# Patient Record
Sex: FEMALE | Employment: UNEMPLOYED | URBAN - METROPOLITAN AREA
[De-identification: names, ages, dates, MRNs, and addresses within clinical notes are randomized per-mention and may not be internally consistent; named-entity substitution may affect disease eponyms.]

---

## 2017-03-15 LAB
HBSAG, EXTERNAL: NEGATIVE
HIV, EXTERNAL: NEGATIVE
RPR, EXTERNAL: NORMAL
RUBELLA, EXTERNAL: NORMAL

## 2017-08-19 ENCOUNTER — HOSPITAL ENCOUNTER (EMERGENCY)
Age: 32
Discharge: HOME | DRG: 540 | End: 2017-08-19
Attending: OBSTETRICS & GYNECOLOGY | Admitting: OBSTETRICS & GYNECOLOGY
Payer: MEDICAID

## 2017-08-19 VITALS
SYSTOLIC BLOOD PRESSURE: 135 MMHG | RESPIRATION RATE: 18 BRPM | BODY MASS INDEX: 27.6 KG/M2 | DIASTOLIC BLOOD PRESSURE: 86 MMHG | WEIGHT: 150 LBS | HEIGHT: 62 IN | HEART RATE: 98 BPM | TEMPERATURE: 97.3 F

## 2017-08-19 PROBLEM — O26.899 ABDOMINAL PAIN IN PREGNANCY: Status: ACTIVE | Noted: 2017-08-19

## 2017-08-19 PROBLEM — R10.9 ABDOMINAL PAIN IN PREGNANCY: Status: ACTIVE | Noted: 2017-08-19

## 2017-08-19 NOTE — PROGRESS NOTES
Preadmission  Questions asked while  is here. No questions at this time. Given unit phone number for pt to call to receive her arrival time for Monday. Will give discharge instructions. Printed in [address]- what to expect, kick counts, labor precautions. marin at bs to assist with interpretation.

## 2017-08-19 NOTE — PROGRESS NOTES
Pt is scheduled for an induction on Monday pm. Instructions given for pt to call [phone] on Monday for her arrival time. [phone] is the pt's number that she can be reached at.

## 2017-08-19 NOTE — DISCHARGE INSTRUCTIONS
CALL mONDAY AT 3:00. [phone] TO GET HER ARRIVAL TIME. Semana 40 de newman embarazo: Instrucciones de cuidado - [ Week 36 of Your Pregnancy: Care Instructions ]  Instrucciones de cuidado    Para la semana 36, usted ha llegado a la fecha probable de [place]. Newman bebé podría venir en cualquier momento. Jonathan es dominique buena idea pensar en el futuro para las próximas semanas y lo que podría suceder. Si esta es la primera vez que tiene un bebé, trate de no preocuparse. Si usted no comienza el trabajo de parto por sí [employer] 41 o 42, newman médico le puede recomendar medicamentos para inducir el [place]. Esta hoja de [employer] acerca de cómo se puede inducir el [place]. Joshua Miles ideas sobre los ejercicios de respiración que puede hacer si [place] a sentirse ansiosa o si está tratando de [place]. La atención de seguimiento es dominique parte clave de newman tratamiento y seguridad. Asegúrese de hacer y acudir a todas las citas, y llame a newman médico si está teniendo problemas. También es dominique buena idea saber los resultados de los exámenes y mantener dominique lista de los medicamentos que suzan. ¿Cómo puede cuidarse en el hogar? Aprenda cómo se puede inducir el trabajo de parto  · Si usted y newman bebé están saludables y preparados, y si newman li uterino ha comenzado a dilatarse, newman médico puede romper la \"kelly\" (ruptura del saco amniótico). Con frecuencia, esto determina el comienzo del trabajo de [place]. · Si el li uterino no está preparado todavía, se le podría administrar un medicamento llamado Pitocin por vía intravenosa (IV), para comenzar las contracciones. · Si el li uterino todavía está [address], podrían colocarle tabletas de prostaglandina (misoprostol) en la vagina para ablandar el li uterino. Pruebe con ejercicios de imaginación guiada para ayudarse a relajar  · Encuentre un lugar cómodo para sentarse o acostarse. Cierre los ojos.   · [address] solo unas cuantas respiraciones profundas para relajarse. · Imagínese un ambiente que está en calma y en figueroa. Vici podría ser [address], un entorno de [facility] pradera o dominique escena que usted Eleonore Pickerel. · Imagine la escena y trate de añadirle algunos detalles. Por ejemplo: ¿Hay john?, ¿cómo es el linda?, ¿está despejado el linda, o hay nubes? · A menudo, ayuda agregar un sendero a la escena. Por ejemplo, al entrar en la pradera, imagine un mikhail que la conduce a través de la pradera a los árboles del otro lado. A medida que siga el mikhail avanzando por la pradera, se siente más y [place]. · Cuando esté muy metida en newman escena y se sienta relajada, tómese unos minutos para respirar lentamente y sentir la calma. · Cuando esté lista, sálgase poco a poco de la escena y regrese a la realidad. Dígase a sí misma que se sentirá relajada y vigorizada, y que se quedará con elbert sensación de calma. · Cuente hasta 3 y jorge alberto los ojos. ¿Dónde puede encontrar más información en inglés? Myron Edward a [email or web address] Zefernie Spotted [id number] en la búsqueda para aprender más acerca de \"Semana 36 de newman [place]: Instrucciones de cuidado - [ Week 36 of Your Pregnancy: Care Instructions ]. \"  Revisado: 16 marzo, 2017  Versión del contenido: 11.3  © 4320-1534 [employer]. Las instrucciones de cuidado fueron adaptadas bajo licencia por [employer] (which disclaims liability or warranty for this information). Si usted tiene [place] afección médica o sobre estas instrucciones, siempre pregunte a newman profesional de logan. [employer] niega toda garantía o responsabilidad por newman uso de esta información. Precauciones en el embarazo:  Instrucciones de cuidado - [ Pregnancy Precautions: Care Instructions ]  Instrucciones de cuidado  No hay dominique manera velarde de prevenir el trabajo de parto antes de la fecha esperada (trabajo de parto prematuro) o de prevenir la [place] de otros problemas en el embarazo. Jonathan hay cosas que puede hacer para aumentar las probabilidades de tener un embarazo saludable. Vaya a cara citas, siga los consejos de newman médico y cuídese. Coma velasquez y huang ejercicio (si newman médico lo permite). Y asegúrese de erika abundante agua. La atención de seguimiento es dominique parte clave de newman tratamiento y seguridad. Asegúrese de hacer y acudir a todas las citas, y llame a newman médico si está teniendo problemas. También es dominique buena idea saber los resultados de los exámenes y mantener dominique lista de los medicamentos que suzan. ¿Cómo puede cuidarse en el hogar? · Asegúrese de asistir a las citas prenatales. Newman médico le tomará la presión arterial en cada consulta. Newman médico también comprobará si tiene proteínas en newman orina. Tanto la presión arterial lissy gab la presencia de proteínas en la orina son señales de preeclampsia. Esta afección puede ser peligrosa tanto para usted gab para newman bebé. · Deyanira abundantes líquidos, suficientes para que newman orina sea de color amarillo stefania o transparente gab el agua. La deshidratación puede causar contracciones. Si tiene [facility], el corazón o el hígado y tiene que [place]'s líquidos, hable con newman médico antes de aumentar newman consumo. · Notifique a newman médico de inmediato si presenta cualquier síntoma de infección, tales gab:  ¨ Ardor cuando orina. ¨ Flujo con mal olor de la vagina. ¨ Comezón en la vagina. ¨ Fiebre sin explicación. ¨ Dolor o sensibilidad inusual en el útero o la parte baja del abdomen. · Aliméntese en forma equilibrada. Incluya muchos alimentos que [place] ricos en calcio y jessenia. ¨ Entre los alimentos ricos en calcio se incluyen la [place], el queso, el yogur, Sheryn Luiz y el brócoli. ¨ Entre los alimentos ricos en jessenia se incluyen las faye jorgensen, los [place], las aves, los [place], los frijoles, las uvas pasas, el pan de grano integral y las verduras de hojas verdes. · No fume.  Si necesita ayuda para dejar de fumar, hable con newman médico sobre programas y medicamentos para dejar de fumar. Estos pueden aumentar cara probabilidades de dejar el hábito para siempre. · No judah alcohol ni use drogas ilegales. · Siga las instrucciones de newman médico acerca de la [place]. Newman médico le dirá cuánto ejercicio puede hacer. · Pregúntele a newman médico si puede tener [employer]. Si usted está en riesgo de tener trabajo de [place], newman médico podría pedirle que no tenga relaciones sexuales. · [place] precauciones para prevenir las caídas. Issa el embarazo las articulaciones están más sueltas y se tiene menos equilibrio. Los deportes tales gab el ciclismo, el esquí o el patinaje en línea pueden aumentar el riesgo de caídas. Y no monte a esteban, ana en motocicleta, huang clavados, huang esquí acuático, bucee, ni salte en paracaídas mientras está embarazada. · Evite calentarse demasiado. No use saunas ni bañeras de hidromasaje. Evite la exposición al sol en climas calientes por mucho tiempo. [place] acetaminofén (Tylenol) para bajar dominique fiebre lissy. · No tome medicamentos de venta nico, productos herbarios ni suplementos sin hablar pari con newman médico o farmacéutico.  ¿Cuándo debe pedir ayuda? Llame al 911 en cualquier momento que considere que necesita atención de [place]. Por ejemplo, llame si:  · Se desmayó (perdió el conocimiento). · Tiene sangrado vaginal intenso. · Tiene dolor intenso en el vientre o la pelvis. · Le sale abundante líquido o gotea de la vagina y sabe o maia que el cordón umbilical se está saliendo a newman vagina. Si esto sucede, arrodíllese de inmediato, de marisol forma que cara nalgas estén más altas que newman desean. [place] disminuirá la presión sobre el cordón umbilical hasta que llegue la [facility].   Llame a newman médico ahora mismo o busque atención médica inmediata si:  · Tiene señales de preeclampsia, tales gab:  ¨ Se le hinchan de manera repentina la ronal, las kevin o los pies.  ¨ Problemas nuevos con la visión (gab oscurecimiento de la visión o visión borrosa). ¨ Dolor de desean intenso. · Tiene cualquier sangrado vaginal.  · Tiene dolor abdominal o cólicos. · Tiene fiebre. · Ha tenido contracciones regulares (con o sin dolor) por [place]. Tulsita significa que tiene 8 o más contracciones en 1 hora o que tiene 4 contracciones o más en 20 minutos después de [place] de posición y erika líquidos. · Tiene dominique pérdida repentina de líquido por la vagina. · Tiene dolor en la parte baja de la espalda o presión en la pelvis que no desaparece. · Nota que newman bebé ha dejado de moverse o se mueve mucho menos de lo normal.  Preste especial atención a los cambios en newman logan y asegúrese de comunicarse con newman médico si tiene algún problema. ¿Dónde puede encontrar más información en inglés? Minor [place] a [email or web address] Tevin Dwyer [id number] en la búsqueda para aprender más acerca de \"Precauciones en el embarazo: Instrucciones de cuidado - [ Pregnancy Precautions: Care Instructions ]. \"  Revisado: 16 marzo, 2017  Versión del contenido: 11.3  © 6913-8875 Healthwise, Incorporated. Las instrucciones de cuidado fueron adaptadas bajo licencia por [employer] Conne[employer] (which disclaims liability or warranty for this information). Si usted tiene [place] afección médica o sobre estas instrucciones, siempre pregunte a newman profesional de logan. Healthwise, Incorporated niega toda garantía o responsabilidad por newman uso de esta información. Conteo de las patadas de newman bebé: Instrucciones de cuidado - [ Jordi Robes Your [de-identified] Kicks: Care Instructions ]  Instrucciones de cuidado  Contar las patadas de newman bebé es dominique manera en la que newman médico puede establecer si newman bebé es saludable. La mayoría de las [facility], sobre todo en el primer embarazo, siente que newman bebé se mueve por primera vez entre las semanas 12 y 25. El movimiento puede sentirse más gab aleteos que gab patadas. Es posible que newman bebé se mueva más a ciertas horas del día. Cuando usted [place], puede notar menos patadas que cuando está descansando. En cara visitas prenatales, newman médico le preguntará si el bebé está activo. En el último trimestre, newman médico le puede pedir que cuente la cantidad de veces que sienta que el bebé se [place]. La atención de seguimiento es dominique parte clave de newman tratamiento y seguridad. Asegúrese de hacer y acudir a todas las citas, y llame a newman médico si está teniendo problemas. También es dominique buena idea saber los resultados de los exámenes y mantener dominique lista de los medicamentos que suzan. ¿Cómo se cuentan las patadas fetales? · Un método común para revisar el movimiento de newman bebé es contar la cantidad de patadas o movimientos que sienta en 1 hora. Es normal sentir 10 movimientos (gab patadas, aleteos o vueltas) en 1 hora. Algunos médicos sugieren que cuente lukas la [facility] llegar a los 10 movimientos. Luego usted puede dejar de contar por ellis día y comenzar otra vez al día siguiente. · Para contar, elija el momento del día en que newman bebé esté más activo. Podría ser cualquier momento entre la mañana y la noche. · Si no siente 10 movimientos en dominique hora, es posible que newman bebé esté durmiendo. Espere hasta la próxima hora y vuelva a contar. ¿Cuándo debe pedir ayuda? Llame a newman médico ahora mismo o busque atención médica inmediata si:  · Siente que newman bebé ha dejado de moverse o se mueve mucho menos de lo normal.  Preste especial atención a los cambios en newman logan y asegúrese de comunicarse con newman médico si tiene cualquier problema. ¿Dónde puede encontrar más información en inglés? Igor Lopez a [email or web address] Roel Linton [id number] en la búsqueda para aprender más acerca de \"Conteo de las patadas de newman bebé: Instrucciones de cuidado - [ Counting Your Baby's Kicks: Care Instructions ]. \"  Revisado: 16 marzo, 2017  Versión del contenido: 11.3  © 7905-5019 Healthwise, Incorporated. Las instrucciones de cuidado fueron adaptadas bajo licencia por [employer] Connections (which disclaims liability or warranty for this information). Si usted tiene [place] afección médica o sobre estas instrucciones, siempre pregunte a newman profesional de logan. Healthwise, Incorporated niega toda garantía o responsabilidad por newman uso de esta información.

## 2017-08-19 NOTE — PROGRESS NOTES
Social work consult needed due to language barrier and to be sure pt has all the resources and supplies she needs for this delivery and time after.

## 2017-08-19 NOTE — PROGRESS NOTES
marin at bs to assist with discharge instructions- kick counts, labor precautions, what to expect at 40 weeks. Instructions printed in [address] Pt without questions and verbalized understanding. Pt left unit ambulating.

## 2017-08-19 NOTE — PROGRESS NOTES
present upon patient admission, Dr. Satnam Alcantar, and Nurse Camille Torres's discharge instructions.

## 2017-08-19 NOTE — IP AVS SNAPSHOT
Eva Carbajal 
 
 
 [address] [place] [address] 
[phone] Patient: Dusty Piedra MRN: [id number] JUANPABLO:7/90/01[birth year] You are allergic to the following No active allergies Recent Documentation Height Weight BMI OB Status Smoking Status 1.575 m 68 kg 27.44 kg/m2 Pregnant Never Smoker Emergency Contacts Name Discharge Info Relation Home Work Mobile [address] [3] [phone] Hamp [place]ette  Spouse [3] [phone] About your hospitalization You were admitted on:  N/A You last received care in the:  SFE 4 ANTEPARTUM You were discharged on:  August 19, 2017 Unit phone number:  [phone] Why you were hospitalized Your primary diagnosis was:  Not on File Your diagnoses also included:  Abdominal Pain In Pregnancy Providers Seen During Your Hospitalizations Provider Role Specialty Primary office phone Merlyn Vaughan MD Attending Provider Obstetrics & Gynecology [phone] Your Primary Care Physician (PCP) Primary Care Physician Office Phone Office Fax UNKNOWN, PROVIDER ** None ** ** None ** Follow-up Information Follow up With Details Comments Contact Info Provider Unknown   Patient not available to ask Your Appointments Monday August 21, 2017  5:30 PM EDT  
CERVIDIL ENTRY PM/AM with SFE OB INDUCTIONS  
SFE 4 L&D PROCEDURE ([address] [place] [address]) [address] [facility] [address]  
[phone] Tuesday August 22, 2017  5:30 AM EDT  
SC INDUCTIONS with SFE OB INDUCTIONS  
SFE 4 L&D PROCEDURE ([address] [place] [address]) [address] [facility] [address]  
[phone] Current Discharge Medication List  
  
ASK your doctor about these medications Dose & Instructions Dispensing Information Comments Morning Noon Evening Bedtime COLACE 100 mg capsule Generic drug:  docusate sodium Your last dose was: Your next dose is:    
   
   
 Dose:  100 mg Take 100 mg by mouth daily. Refills:  0  
     
   
   
   
  
 ferrous sulfate 325 mg (65 mg iron) tablet Your last dose was: Your next dose is:    
   
   
 Dose:  325 mg Take 325 mg by mouth daily. Refills:  0 PRENATAL #2 PO Your last dose was: Your next dose is: Take  by mouth. Refills:  0 Discharge Instructions CALL mONDAY AT 3:00. [phone] TO GET HER ARRIVAL TIME. Semana 40 de newman embarazo: Instrucciones de cuidado - [ Week 36 of Your Pregnancy: Care Instructions ] Instrucciones de cuidado Para la semana 40, usted ha llegado a la fecha probable de Tigre. Newman bebé podría venir en cualquier momento. Jonathan es dominique buena idea pensar en el futuro para las próximas semanas y lo que podría suceder. Si esta es la primera vez que tiene un bebé, trate de no preocuparse. Si usted no comienza el trabajo de parto por sí [employer] 41 o 42, newman médico le puede recomendar medicamentos para inducir el [place] de Tigre. Esta hoja de Microsoft acerca de cómo se puede inducir el Vie[place]ach de [place]. Gladis Poke ideas sobre los ejercicios de respiración que puede hacer si Littie Baller a sentirse ansiosa o si está tratando de [place]. La atención de seguimiento es dominique parte clave de newman tratamiento y seguridad. Asegúrese de hacer y acudir a todas las citas, y llame a newman médico si está teniendo problemas. También es dominique buena idea saber los resultados de los exámenes y mantener dominique lista de los medicamentos que suzan. Cómo puede cuidarse en el hogar? Aprenda cómo se puede inducir el trabajo de Tigre · Si usted y newman bebé están saludables y preparados, y si newman li uterino ha comenzado a dilatarse, newman médico puede romper la \"kelly\" (ruptura del saco amniótico). Con frecuencia, esto determina el comienzo del trabajo de Tigre. · Si el li uterino no está preparado todavía, se le podría administrar un medicamento llamado Pitocin por vía intravenosa (IV), para comenzar las contracciones. · Si el li uterino todavía está [address], podrían colocarle tabletas de prostaglandina (misoprostol) en la vagina para ablandar el li uterino. Pruebe con ejercicios de imaginación guiada para ayudarse a relajar · Encuentre un lugar cómodo para sentarse o acostarse. Cierre los ojos. · Empiece tomando solo unas cuantas respiraciones profundas para relajarse. · Imagínese un ambiente que está en calma y en figueroa. [place] podría ser [address], un entorno de [facility] pradera o dominique escena que usted [place]. · Imagine la escena y trate de añadirle algunos detalles. Por ejemplo: 
Hay john?, 
cómo es el linda?, 
está despejado el linda, o hay nubes? · A menudo, ayuda agregar un sendero a la escena. Por ejemplo, al entrar en la pradera, imagine un mikhail que la conduce a través de la pradera a los árboles del otro lado. A medida que siga el mikhail avanzando por la pradera, se siente más y [place]. · Cuando esté muy metida en newman escena y se sienta relajada, tómese unos minutos para respirar lentamente y sentir la calma. · Cuando esté lista, sálgase poco a poco de la escena y regrese a la realidad. Dígase a sí misma que se sentirá relajada y vigorizada, y que se quedará con elbert sensación de calma. · Cuente hasta 3 y jorge alberto los ojos. Dónde puede encontrar más información en inglés? Neelima Phelps a [email or web address] Darlin Dai S499 en la búsqueda para aprender más acerca de \"Semana 36 de newman Rosa [place]: Instrucciones de cuidado - [ Week 36 of Your Pregnancy: Care Instructions ]. \" Revisado: 16 marzo, 2017 Versión del contenido: 11.3 © 8785-6768 Healthwise, Incorporated. Las instrucciones de cuidado fueron adaptadas bajo licencia por Good Help Connections (which disclaims liability or warranty for this information). Si usted tiene [place] afección médica o sobre estas instrucciones, siempre pregunte a newman profesional de logan. Healthwise, Incorporated niega toda garantía o responsabilidad por newman uso de esta información. Precauciones en el embarazo: Instrucciones de cuidado - [ Pregnancy Precautions: Care Instructions ] Instrucciones de cuidado No hay dominique manera velarde de prevenir el trabajo de parto antes de la fecha esperada (trabajo de parto prematuro) o de prevenir la mayoría de otros problemas en el [facility]. Jonathan hay cosas que puede hacer para aumentar las probabilidades de tener un embarazo saludable. Vaya a cara citas, siga los consejos de newman médico y cuídese. Coma velasquez y huang ejercicio (si newman médico lo permite). Y asegúrese de erika abundante agua. La atención de seguimiento es dominique parte clave de newman tratamiento y seguridad. Asegúrese de hacer y acudir a todas las citas, y llame a newman médico si está teniendo problemas. También es dominique buena idea saber los resultados de los exámenes y mantener dominique lista de los medicamentos que suzan. Cómo puede cuidarse en el hogar? · Asegúrese de asistir a las citas prenatales. Newman médico le tomará la presión arterial en cada consulta. Newman médico también comprobará si tiene proteínas en newman orina. Tanto la presión arterial lissy gab la presencia de proteínas en la orina son señales de preeclampsia. Esta afección puede ser peligrosa tanto para usted gab para newman bebé. · Deyanira abundantes líquidos, suficientes para que newman orina sea de color amarillo stefania o transparente gab el agua. La deshidratación puede causar contracciones.  Si tiene [facility] [employer], el corazón o el hígado y tiene que Twyla's líquidos, hable con pena médico antes de aumentar pena consumo. · Notifique a pena médico de inmediato si presenta cualquier síntoma de infección, tales gab: ¨ Ardor cuando orina. ¨ Flujo con mal olor de la vagina. ¨ Comezón en la vagina. ¨ Fiebre sin explicación. ¨ Dolor o sensibilidad inusual en el útero o la parte baja del abdomen. · Aliméntese en forma equilibrada. Incluya muchos alimentos que napole[place] ricos en calcio y jessenia. ¨ Entre los alimentos ricos en calcio se incluyen la [place], el queso, el yogur, Ishmael Reno y el brócoli. ¨ Entre los alimentos ricos en jessenia se incluyen las faye jorgensen, los [place], las aves, los [place], los frijoles, las uvas pasas, el pan de grano integral y las verduras de hojas verdes. · No fume. Si necesita ayuda para dejar de fumar, hable con pena médico sobre programas y medicamentos para dejar de fumar. Estos pueden aumentar cara probabilidades de dejar el hábito para siempre. · No judah alcohol ni use drogas ilegales. · Siga las instrucciones de pena médico acerca de la [place]. Pena médico le dirá cuánto ejercicio puede hacer. · Pregúntele a pena médico si puede tener [employer]. Si usted está en riesgo de tener trabajo de [place], pena médico podría pedirle que no tenga relaciones sexuales. · [place] precauciones para prevenir las caídas. Issa el embarazo las articulaciones están más sueltas y se tiene menos equilibrio. Los deportes tales gab el ciclismo, el esquí o el patinaje en línea pueden aumentar el riesgo de caídas. Y no monte a esteban, ana en motocicleta, huang clavados, huang esquí acuático, bucee, ni salte en paracaídas mientras está embarazada. · Evite calentarse demasiado. No use saunas ni bañeras de hidromasaje. Evite la exposición al sol en climas calientes por mucho tiempo. [place] acetaminofén (Tylenol) para bajar dominique fiebre lissy.  
· No tome medicamentos de venta nico, productos herbarios ni suplementos sin hablar pari con newman médico o farmacéutico. 

Cuándo debe pedir ayuda? Llame al 911 en cualquier momento que considere que necesita atención de [place]. Por ejemplo, llame si: 
· Se desmayó (perdió el conocimiento). · Tiene sangrado vaginal intenso. · Tiene dolor intenso en el vientre o la pelvis. · Le sale abundante líquido o gotea de la vagina y sabe o maia que el cordón umbilical se está saliendo a newman vagina. Si esto sucede, arrodíllese de inmediato, de marisol forma que cara nalgas estén más altas que newman desean. [place] disminuirá la presión sobre el cordón umbilical hasta que llegue la [facility]. Llame a newman médico ahora mismo o busque atención médica inmediata si: · Tiene señales de preeclampsia, tales gab: ¨ Se le hinchan de manera repentina la ronal, las kevin o los pies. ¨ Problemas nuevos con la visión (gab oscurecimiento de la visión o visión borrosa). ¨ Dolor de desean intenso. · Tiene cualquier sangrado vaginal. 
· Tiene dolor abdominal o cólicos. · Tiene fiebre. · Ha tenido contracciones regulares (con o sin dolor) por Susie Benny. [place]ady significa que tiene 8 o más contracciones en 1 hora o que tiene 4 contracciones o más en 20 minutos después de [place] de posición y erika líquidos. · Tiene dominique pérdida repentina de líquido por la vagina. · Tiene dolor en la parte baja de la espalda o presión en la pelvis que no desaparece. · Nota que newman bebé ha dejado de moverse o se mueve mucho menos de lo normal. 
Preste especial atención a los cambios en newman logan y asegúrese de comunicarse con newman médico si tiene algún problema. Dónde puede encontrar más información en inglés? Aaliyah Prather a [email or web address].[email or web address] Eleanor Murrell G667 en la búsqueda para aprender más acerca de \"Precauciones en el embarazo: Instrucciones de cuidado - [ Pregnancy Precautions: Care Instructions ]. \" 
Revisado: 16 marzo, 2017 Versión del contenido: 11.3 © 4282-9139 Healthwise, Incorporated. Las instrucciones de cuidado fueron adaptadas bajo licencia por Good [employer] Connections (which disclaims liability or warranty for this information). Si usted tiene [place] afección médica o sobre estas instrucciones, siempre pregunte a newman profesional de logan. Healthwise, Incorporated niega toda garantía o responsabilidad por newman uso de esta información. Conteo de las patadas de newman bebé: Instrucciones de cuidado - [ Counting Your Baby's Kicks: Care Instructions ] Instrucciones de cuidado Contar las patadas de newman bebé es dominique manera en la que newman médico puede establecer si newman bebé es saludable. La mayoría de las [facility], sobre todo en el primer embarazo, siente que newman bebé se mueve por primera vez entre las semanas 12 y 25. El movimiento puede sentirse más gab aleteos que gab patadas. Es posible que newman bebé se mueva más a ciertas horas del día. Cuando usbarbara Wells Mohamud, puede notar menos patadas que cuando está descansando. En cara visitas prenatales, newman médico le preguntará si el bebé está activo. En el último trimestre, newman médico le puede pedir que cuente la cantidad de veces que sienta que el bebé se [place]. La atención de seguimiento es dominique parte clave de newman tratamiento y seguridad. Asegúrese de hacer y acudir a todas las citas, y llame a newman médico si está teniendo problemas. También es dominique buena idea saber los resultados de los exámenes y mantener dominique lista de los medicamentos que suzan. Cómo se cuentan las patadas fetales? · Un método común para revisar el movimiento de newman bebé es contar la cantidad de patadas o movimientos que sienta en 1 hora. Es normal sentir 10 movimientos (gab patadas, aleteos o vueltas) en 1 hora. Algunos médicos sugieren que cuente lukas la [facility] llegar a los 10 movimientos. Luego usted puede dejar de contar por ellis día y comenzar otra vez al día siguiente. · Para contar, elija el momento del día en que newman bebé esté más activo. Podría ser cualquier momento entre la mañana y la noche. · Si no siente 10 movimientos en dominique hora, es posible que newman bebé esté durmiendo. Espere hasta la próxima hora y vuelva a contar. Cuándo debe pedir ayuda? Llame a newman médico ahora mismo o busque atención médica inmediata si: 
· Siente que newman bebé ha dejado de moverse o se mueve mucho menos de lo normal. 
Preste especial atención a los cambios en newman logan y asegúrese de comunicarse con newman médico si tiene cualquier problema. Dónde puede encontrar más información en inglés? Duke Mott a [email or web address] Wilberto Barron Q306 en la búsqueda para aprender más acerca de \"Conteo de las patadas de newman bebé: Instrucciones de cuidado - [ Counting Your Baby's Kicks: Care Instructions ]. \" 
Revisado: 16 marzo, 2017 Versión del contenido: 11.3 © 3920-2975 Healthwise, Incorporated. Las instrucciones de cuidado fueron adaptadas bajo licencia por Good Help Connections (which disclaims liability or warranty for this information). Si usted tiene [place] afección médica o sobre estas instrucciones, siempre pregunte a newman profesional de logan. Healthwise, Incorporated niega toda garantía o responsabilidad por newman uso de esta información. Discharge Orders None Introducing [facility]! [facility] introduce portal paciente MyChart . Ahora se puede acceder a partes de newman expediente médico, enviar por correo electrónico la oficina de newman médico y solicitar renovaciones de medicamentos en línea. En newman navegador de Internet , Obed Tee a [email or web address] Ezra clic en el usuario por Ehsan Castro? Megha De La Oer clic aquí en la sesión Bailey Alvarez. Verá la página de registro [place]. Ingrese newman código de [facility] marisol y gab aparece a continuación.  Usted no tendrá que [employer] código después de belia completado el proceso de registro . Si usted no se inscribe antes de la fecha de caducidad , debe solicitar un nuevo código. · MyChart Código de acceso : [id number] Expires: 9/5/2017  4:26 PM 
 
Ingresa los últimos cuatro dígitos de newman Número de Seguro Social ( xxxx ) y fecha de nacimiento ( dd / mm / aaaa ) gab se indica y ezra clic en Enviar. Usted será llevado a la siguiente página de registro . Crear un ID MyChart . Esta será newman ID de inicio de sesión de MyChart y no puede ser [place] , por lo que pensar en dominique que es Gemma Grills y fácil de recordar . Crear dominique contraseña MyChart . Usted puede cambiar newman contraseña en cualquier momento . Ingrese newman Password Reset de preguntas y [place] . Orick se puede utilizar en un momento posterior si usted olvida newman contraseña. Introduzca newman dirección de correo electrónico . Dearl Bird recibirá dominique notificación por correo electrónico cuando la nueva información está disponible en MyChart . Creed Hipps clic en Registrarse. Vianne Jaswinder rama y descargar porciones de newman expediente médico. 
Ezra clic en el enlace de descarga del menú Resumen para descargar dominique copia portátil de newman información médica . Si tiene [employer] , por favor visite la sección de preguntas frecuentes del sitio web MyChart . Recuerde, MyChart NO es que se utilizará para las necesidades urgentes. Para emergencias médicas , llame al 911 . Ahora disponible en newman iPhone y Android ! General Information Please provide this summary of care documentation to your next provider. Patient Signature:  ____________________________________________________________ Date:  ____________________________________________________________  
  
Gearldine Moulds Provider Signature:  ____________________________________________________________  Date:  ____________________________________________________________  
  
  
   
  
[address] 
 [facility] [address] 
[phone] Patient: Matias Raphael MRN: [id number] BKW:[id number] Tiene alergias a lo siguiente No tiene alergias Documentación recientes Height Weight BMI (IMC) Estado obstétrico Estatus de tabaquísmo 1.575 m 68 kg 27.44 kg/m2 Pregnant Never Smoker Emergency Contacts Name Discharge Info Relation Home Work Mobile [address] [3] [phone] Kenrick Gonsalez  Spouse [3] [phone] Sobre pena hospitalización Le admitieron el:  N/A Pena tratamiento más reciente fue el:  SFE 4 ANTEPARTUM Le dieron de lissy el:  August 19, 2017 Número de teléfono de la unidad:  [phone] Por qué le ingresaron Pena diagnosis primaria es:  No está archivado/a Pena diagnosis también incluye:  Abdominal Pain In Pregnancy Proveedores de verse lukas thalia hospitalizaciones Personal Médico Rol [facility]special[facility] Teléfono principal de la oficina Drew Lo MD Attending Provider Obstetrics & Gynecology [phone] Pena médico de atención primaria (PCP ) Primary Care Physician Office Phone Office Fax UNKNOWN, PROVIDER ** None ** ** None ** Follow-up Information Follow up With Details Comments Contact Info Provider Unknown   Patient not available to ask Thalia citas Monday August 21, 2017  5:30 PM EDT  
CERVIDIL ENTRY PM/AM with SFE OB INDUCTIONS  
SFE 4 L&D PROCEDURE ([address]) [address] [facility] [address]  
[phone] Tuesday August 22, 2017  5:30 AM EDT  
SC INDUCTIONS with SFE OB INDUCTIONS  
SFE 4 L&D PROCEDURE ([address]) [address] [facility] [address]  
[phone] Aprobación de la gestión actual lista de medicamentos CONSULTE con pena médico sobre Do It Original Dosis e instrucciones Información de dispensación Comentarios Morning Noon Evening Bedtime COLACE 100 mg capsule Medicamento genérico:  docusate sodium Your last dose was: Your next dose is:    
   
   
 Dosis:  100 mg Take 100 mg by mouth daily. recargas:  0  
     
   
   
   
  
 ferrous sulfate 325 mg (65 mg iron) tablet Your last dose was: Your next dose is:    
   
   
 Dosis:  325 mg Take 325 mg by mouth daily. recargas:  0 PRENATAL #2 PO Your last dose was: Your next dose is: Take  by mouth. recargas:  0 Discharge Instructions CALL mONDAY AT 3:00. [phone] TO GET HER ARRIVAL TIME. Semana 40 de newman embarazo: Instrucciones de cuidado - [ Week 36 of Your Pregnancy: Care Instructions ] Instrucciones de cuidado Para la semana 40, usted ha llegado a la fecha probable de Tigre. Newman bebé podría venir en cualquier momento. Jonathan es dominique buena idea pensar en el futuro para las próximas semanas y lo que podría suceder. Si esta es la primera vez que tiene un bebé, trate de no preocuparse. Si usted no comienza el trabajo de parto por sí [employer] 41 o 42, newman médico le puede recomendar medicamentos para inducir el [place]ie[place] de Tigre. Esta hoja de [employer] acerca de cómo se puede inducir el Viechtach de Tigre. Jose Maria Pique ideas sobre los ejercicios de respiración que puede hacer si Lidya Calvin a sentirse ansiosa o si está tratando de [place]. La atención de seguimiento es dominique parte clave de newman tratamiento y seguridad. Asegúrese de hacer y acudir a todas las citas, y llame a newman médico si está teniendo problemas. También es dominique buena idea saber los resultados de los exámenes y mantener dominique lista de los medicamentos que suzan. Cómo puede cuidarse en el hogar? Aprenda cómo se puede inducir el trabajo de Tigre · Si usted y newman bebé están saludables y preparados, y si newman li uterino ha comenzado a dilatarse, newman médico puede romper la \"kelly\" (ruptura del saco amniótico). Con frecuencia, esto determina el comienzo del trabajo de Tigre. · Si el li uterino no está preparado todavía, se le podría administrar un medicamento llamado Pitocin por vía intravenosa (IV), para comenzar las contracciones. · Si el li uterino todavía está [address], podrían colocarle tabletas de prostaglandina (misoprostol) en la vagina para ablandar el li uterino. Pruebe con ejercicios de imaginación guiada para ayudarse a relajar · Encuentre un lugar cómodo para sentarse o acostarse. Cierre los ojos. · Empiece tomando solo unas cuantas respiraciones profundas para relajarse. · Imagínese un ambiente que está en calma y en figueroa. [place] podría ser [address], un entorno de [facility] pradera o dominique escena que usted [place]. · Imagine la escena y trate de añadirle algunos detalles. Por ejemplo: 
Hay john?, 
cómo es el linda?, 
está despejado el linda, o hay nubes? · A menudo, ayuda agregar un sendero a la escena. Por ejemplo, al entrar en la pradera, imagine un mikhail que la conduce a través de la pradera a los árboles del otro lado. A medida que siga el mikhail avanzando por la pradera, se siente más y [place]. · Cuando esté muy metida en newman escena y se sienta relajada, tómese unos minutos para respirar lentamente y sentir la calma. · Cuando esté lista, sálgase poco a poco de la escena y regrese a la realidad. Dígase a sí misma que se sentirá relajada y vigorizada, y que se quedará con elbert sensación de calma. · Cuente hasta 3 y jorge alberto los ojos. Dónde puede encontrar más información en inglés? Neelima Phelps a [email or web address] Darlin Dai A215 en la búsqueda para aprender más acerca de \"Semana 36 de newman [place]: Instrucciones de cuidado - [ Week 36 of Your Pregnancy: Care Instructions ]. \" Revisado: 16 marzo, 2017 Versión del contenido: 11.3 © 3663-6660 Healthwise, Incorporated. Las instrucciones de cuidado fueron adaptadas bajo licencia por Good Help Connections (which disclaims liability or warranty for this information). Si usted tiene [place] afección médica o sobre estas instrucciones, siempre pregunte a newman profesional de logan. Healthwise, Incorporated niega toda garantía o responsabilidad por newman uso de esta información. Precauciones en el embarazo: Instrucciones de cuidado - [ Pregnancy Precautions: Care Instructions ] Instrucciones de cuidado No hay dominique manera velarde de prevenir el trabajo de parto antes de la fecha esperada (trabajo de parto prematuro) o de prevenir la mayoría de otros problemas en el [facility]. Jonathan hay cosas que puede hacer para aumentar las probabilidades de tener un embarazo saludable. Vaya a cara citas, siga los consejos de newman médico y cuídese. Coma velasquez y huang ejercicio (si newman médico lo permite). Y asegúrese de erika abundante agua. La atención de seguimiento es dominique parte clave de newman tratamiento y seguridad. Asegúrese de hacer y acudir a todas las citas, y llame a newman médico si está teniendo problemas. También es dominique buena idea saber los resultados de los exámenes y mantener dominique lista de los medicamentos que suzan. Cómo puede cuidarse en el hogar? · Asegúrese de asistir a las citas prenatales. Newman médico le tomará la presión arterial en cada consulta. Newman médico también comprobará si tiene proteínas en newman orina. Tanto la presión arterial lissy gab la presencia de proteínas en la orina son señales de preeclampsia. Esta afección puede ser peligrosa tanto para usted gab para newman bebé. · Deyanira abundantes líquidos, suficientes para que newman orina sea de color amarillo stefania o transparente gab el agua. La deshidratación puede causar contracciones.  Si tiene [place] [facility], el corazón o el hígado y tiene que Twyla's líquidos, hable con pena médico antes de aumentar pena consumo. · Notifique a pena médico de inmediato si presenta cualquier síntoma de infección, tales gab: ¨ Ardor cuando orina. ¨ Flujo con mal olor de la vagina. ¨ Comezón en la vagina. ¨ Fiebre sin explicación. ¨ Dolor o sensibilidad inusual en el útero o la parte baja del abdomen. · Aliméntese en forma equilibrada. Incluya muchos alimentos que [place] ricos en calcio y jessenia. ¨ Entre los alimentos ricos en calcio se incluyen la [place], el queso, el yogur, Ishmael Reno y el brócoli. ¨ Entre los alimentos ricos en jessenia se incluyen las faye jorgensen, los [place], las aves, los [place], los frijoles, las uvas pasas, el pan de grano integral y las verduras de hojas verdes. · No fume. Si necesita ayuda para dejar de fumar, hable con pena médico sobre programas y medicamentos para dejar de fumar. Estos pueden aumentar cara probabilidades de dejar el hábito para siempre. · No judah alcohol ni use drogas ilegales. · Siga las instrucciones de pena médico acerca de la [place]. Pena médico le dirá cuánto ejercicio puede hacer. · Pregúntele a pena médico si puede tener [employer]. Si usted está en riesgo de tener trabajo de [place], pena médico podría pedirle que no tenga relaciones sexuales. · [place] precauciones para prevenir las caídas. Issa el embarazo las articulaciones están más sueltas y se tiene menos equilibrio. Los deportes tales gab el ciclismo, el esquí o el patinaje en línea pueden aumentar el riesgo de caídas. Y no monte a esteban, ana en motocicleta, huang clavados, huang esquí acuático, bucee, ni salte en paracaídas mientras está embarazada. · Evite calentarse demasiado. No use saunas ni bañeras de hidromasaje. Evite la exposición al sol en climas calientes por mucho tiempo. [place] acetaminofén (Tylenol) para bajar dominique fiebre lissy.  
· No tome medicamentos de venta nico, productos herbarios ni suplementos sin hablar pari con newman médico o farmacéutico. 

Cuándo debe pedir ayuda? Llame al 911 en cualquier momento que considere que necesita atención de [place]. Por ejemplo, llame si: 
· Se desmayó (perdió el conocimiento). · Tiene sangrado vaginal intenso. · Tiene dolor intenso en el vientre o la pelvis. · Le sale abundante líquido o gotea de la vagina y sabe o maia que el cordón umbilical se está saliendo a newman vagina. Si esto sucede, arrodíllese de inmediato, de marisol forma que cara nalgas estén más altas que newman desean. [place] disminuirá la presión sobre el cordón umbilical hasta que llegue la [facility]. Llame a newman médico ahora mismo o busque atención médica inmediata si: · Tiene señales de preeclampsia, tales gab: ¨ Se le hinchan de manera repentina la ronal, las kevin o los pies. ¨ Problemas nuevos con la visión (gab oscurecimiento de la visión o visión borrosa). ¨ Dolor de desean intenso. · Tiene cualquier sangrado vaginal. 
· Tiene dolor abdominal o cólicos. · Tiene fiebre. · Ha tenido contracciones regulares (con o sin dolor) por Susie Benny. [place] significa que tiene 8 o más contracciones en 1 hora o que tiene 4 contracciones o más en 20 minutos después de [place] de posición y erika líquidos. · Tiene dominique pérdida repentina de líquido por la vagina. · Tiene dolor en la parte baja de la espalda o presión en la pelvis que no desaparece. · Nota que newman bebé ha dejado de moverse o se mueve mucho menos de lo normal. 
Preste especial atención a los cambios en newman logan y asegúrese de comunicarse con newman médico si tiene algún problema. Dónde puede encontrar más información en inglés? Aaliyah Prather a [email or web address] Eleanor Murrell [id number] en la búsqueda para aprender más acerca de \"Precauciones en el embarazo: Instrucciones de cuidado - [ Pregnancy Precautions: Care Instructions ]. \" 
Revisado: 16 marzo, 2017 Versión del contenido: 11.3 © 6208-2925 Healthwise, Incorporated. Las instrucciones de cuidado fueron adaptadas bajo licencia por [employer] (which disclaims liability or warranty for this information). Si usted tiene [place] afección médica o sobre estas instrucciones, siempre pregunte a newman profesional de logan. Healthwise, Incorporated niega toda garantía o responsabilidad por newman uso de esta información. Conteo de las patadas de newman bebé: Instrucciones de cuidado - [ Counting Your Baby's Kicks: Care Instructions ] Instrucciones de cuidado Contar las patadas de newman bebé es dominique manera en la que newman médico puede establecer si newman bebé es saludable. La mayoría de las [facility], sobre todo en el primer embarazo, siente que newman bebé se mueve por primera vez entre las semanas 12 y 25. El movimiento puede sentirse más gab aleteos que gab patadas. Es posible que newman bebé se mueva más a ciertas horas del día. Cuando usbarbara [place] Mohamud, puede notar menos patadas que cuando está descansando. En cara visitas prenatales, newman médico le preguntará si el bebé está activo. En el último trimestre, newman médico le puede pedir que cuente la cantidad de veces que sienta que el bebé se [place]. La atención de seguimiento es dominique parte clave de newman tratamiento y seguridad. Asegúrese de hacer y acudir a todas las citas, y llame a enwman médico si está teniendo problemas. También es dominique buena idea saber los resultados de los exámenes y mantener dominique lista de los medicamentos que suzan. Cómo se cuentan las patadas fetales? · Un método común para revisar el movimiento de newman bebé es contar la cantidad de patadas o movimientos que sienta en 1 hora. Es normal sentir 10 movimientos (gab patadas, aleteos o vueltas) en 1 hora. Algunos médicos sugieren que cuente lukas la [facility] llegar a los 10 movimientos. Luego usted puede dejar de contar por ellis día y comenzar otra vez al día siguiente. · Para contar, elija el momento del día en que newman bebé esté más activo. Podría ser cualquier momento entre la mañana y la noche. · Si no siente 10 movimientos en dominique hora, es posible que newman bebé esté durmiendo. Espere hasta la próxima hora y vuelva a contar. Cuándo debe pedir ayuda? Llame a newman médico ahora mismo o busque atención médica inmediata si: 
· Siente que newman bebé ha dejado de moverse o se mueve mucho menos de lo normal. 
Preste especial atención a los cambios en newman logan y asegúrese de comunicarse con newman médico si tiene cualquier problema. Dónde puede encontrar más información en inglés? Ayesha Murrieta a [email or web address] Hussain Her X915 en la búsqueda para aprender más acerca de \"Conteo de las patadas de newman bebé: Instrucciones de cuidado - [ Counting Your Baby's Kicks: Care Instructions ]. \" 
Revisado: 16 marzo, 2017 Versión del contenido: 11.3 © 9510-8821 Healthwise, Incorporated. Las instrucciones de cuidado fueron adaptadas bajo licencia por [employer] (which disclaims liability or warranty for this information). Si usted tiene [place] afección médica o sobre estas instrucciones, siempre pregunte a newman profesional de logan. Healthwise, Incorporated niega toda garantía o responsabilidad por newman uso de esta información. Discharge Orders [email or web address] Introducing \A [facility]ology [facility] [place] [facility]\"" & HEALTH SERVICES! [employer] introduce portal paciente [place] . Ahora se puede acceder a partes de newman expediente médico, enviar por correo electrónico la oficina de newman médico y solicitar renovaciones de medicamentos en línea. En newman navegador de Internet , Florencia Basil a [email or web address] Ezra clic en el usuario por Sunitha Mora? Lisa Loomis clic aquí en la sesión Luciano Patel. Verá la página de registro [place]. Ingrese newman código de [facility] marisol y gab aparece a continuación.  Usted no tendrá que [employer] código después de belia completado el proceso de registro . Si usted no se inscribe antes de la fecha de caducidad , debe solicitar un nuevo código. · MyChart Código de acceso : [id number] Expires: 9/5/2017  4:26 PM 
 
Ingresa los últimos cuatro dígitos de newman Número de Seguro Social ( xxxx ) y fecha de nacimiento ( dd / mm / aaaa ) gab se indica y ezra clic en Enviar. Usted será llevado a la siguiente página de registro . Crear un ID MyChart . Esta será newman ID de inicio de sesión de MyChart y no puede ser [place] , por lo que pensar en dominique que es Teresa Navjot y fácil de recordar . Crear dominique contraseña MyChart . Usted puede cambiar newman contraseña en cualquier momento . Ingrese newman Password Reset de preguntas y [place] . [place] se puede utilizar en un momento posterior si usted olvida newman contraseña. Introduzca newman dirección de correo electrónico . Thien Merritt recibirá dominique notificación por correo electrónico cuando la nueva información está disponible en MyChart . Marilia Rail clic en Registrarse. Silvia Ivania rama y descargar porciones de newman expediente médico. 
Ezra clic en el enlace de descarga del menú Resumen para descargar dominique copia portátil de newman información médica . Si tiene [employer] , por favor visite la sección de preguntas frecuentes del sitio web MyChart . Recuerde, MyChart NO es que se utilizará para las necesidades urgentes. Para emergencias médicas , llame al 911 . Ahora disponible en newman iPhone y Android ! General Information Please provide this summary of care documentation to your next provider. Patient Signature:  ____________________________________________________________ Date:  ____________________________________________________________  
  
Tana Piety Provider Signature:  ____________________________________________________________ Date:  ____________________________________________________________

## 2017-08-19 NOTE — IP AVS SNAPSHOT
Summary of Care Report The Summary of Care report has been created to help improve care coordination. Users with access to [facility] or [address] (Web-based application) may access additional patient information including the Discharge Summary. If you are not currently a [address] user and need more information, please call the number listed below in the [place] section and ask to be connected with Medical Records. Facility Information Name Address Phone [id number] [place] [address] Road [address] [place] [id number] [phone] Patient Information Patient Name Sex  Davis Chávez ([id number]) Female 1985 Discharge Information Admitting Provider Service Area Unit Maryan Mckinley MD / [address] 4 Antepartum / [phone] Discharge Provider Discharge Date/Time Discharge Disposition Destination (none) 2017 17:40 (Pending) AHR (none) Patient Language Language Irish [40] Hospital Problems as of 2017  Reviewed: 2017  5:07 PM by Maryan Mckinley MD  
  
  
  
 Class Noted - Resolved Last Modified POA Active Problems Abdominal pain in pregnancy  2017 - Present 2017 by Maryan Mckinley MD Unknown Entered by Maryan Mckinley MD  
  
Non-Hospital Problems as of 2017  Reviewed: 2017  5:07 PM by Maryan Mckinley MD  
 None You are allergic to the following No active allergies Current Discharge Medication List  
  
ASK your doctor about these medications Dose & Instructions Dispensing Information Comments COLACE 100 mg capsule Generic drug:  docusate sodium Dose:  100 mg Take 100 mg by mouth daily. Refills:  0  
   
 ferrous sulfate 325 mg (65 mg iron) tablet Dose:  325 mg Take 325 mg by mouth daily. Refills:  0 PRENATAL #2 PO Take  by mouth. Refills:  0 Follow-up Information Follow up With Details Comments Contact Info Provider Unknown   Patient not available to ask Discharge Instructions CALL mONDAY AT 3:00. [phone] TO GET HER ARRIVAL TIME. Semana 40 de newman embarazo: Instrucciones de cuidado - [ Week 36 of Your Pregnancy: Care Instructions ] Instrucciones de cuidado Para la semana 40, usted ha llegado a la fecha probable de [place]. Newman bebé podría venir en cualquier momento. Jonathan es dominique buena idea pensar en el futuro para las próximas semanas y lo que podría suceder. Si esta es la primera vez que tiene un bebé, trate de no preocuparse. Si usted no comienza el trabajo de parto por sí [employer] 41 o 42, newman médico le puede recomendar medicamentos para inducir el [place]. Esta hoja de [employer] acerca de cómo se puede inducir el [place]. Remigio Aranda ideas sobre los ejercicios de respiración que puede hacer si [place] a sentirse ansiosa o si está tratando de [place]. La atención de seguimiento es dominique parte clave de newman tratamiento y seguridad. Asegúrese de hacer y acudir a todas las citas, y llame a newman médico si está teniendo problemas. También es dominique buena idea saber los resultados de los exámenes y mantener dominique lista de los medicamentos que suzan. Cómo puede cuidarse en el hogar? Aprenda cómo se puede inducir el trabajo de [place] · Si usted y newman bebé están saludables y preparados, y si newman li uterino ha comenzado a dilatarse, newman médico puede romper la \"kelly\" (ruptura del saco amniótico). Con frecuencia, esto determina el comienzo del trabajo de [place]. · Si el li uterino no está preparado todavía, se le podría administrar un medicamento llamado Pitocin por vía intravenosa (IV), para comenzar las contracciones.  
· Si el li uterino todavía está [address], podrían colocarle tabletas de prostaglandina (misoprostol) en la vagina para ablandar el li uterino. Pruebe con ejercicios de imaginación guiada para ayudarse a relajar · Encuentre un lugar cómodo para sentarse o acostarse. Cierre los ojos. · Empiece tomando solo unas cuantas respiraciones profundas para relajarse. · Imagínese un ambiente que está en calma y en figueroa. [place] podría ser [address], un entorno de [facility], [facility] pradera o dominique escena que usted Karl Stroud. · Imagine la escena y trate de añadirle algunos detalles. Por ejemplo: 
Hay john?, 
cómo es el linda?, 
está despejado el linda, o hay nubes? · A menudo, ayuda agregar un sendero a la escena. Por ejemplo, al entrar en la pradera, imagine un mikhail que la conduce a través de la pradera a los árboles del otro lado. A medida que siga el mikhail avanzando por la pradera, se siente más y [place]. · Cuando esté muy metida en newman escena y se sienta relajada, tómese unos minutos para respirar lentamente y sentir la calma. · Cuando esté lista, sálgase poco a poco de la escena y regrese a la realidad. Dígase a sí misma que se sentirá relajada y vigorizada, y que se quedará con elbert sensación de calma. · Cuente hasta 3 y jorge alberto los ojos. Dónde puede encontrar más información en inglés? Leonel Almazan a [email or web address] Mirian Mackay A506 en la búsqueda para aprender más acerca de \"Semana 36 de newman Shantel Bereket: Instrucciones de cuidado - [ Week 36 of Your Pregnancy: Care Instructions ]. \" 
Revisado: 16 marzo, 2017 Versión del contenido: 11.3 © 2786-1581 Healthwise, Incorporated. Las instrucciones de cuidado fueron adaptadas bajo licencia por [employer] (which disclaims liability or warranty for this information). Si usted tiene [place] afección médica o sobre estas instrucciones, siempre pregunte a newman profesional de logan. Healthwise, Incorporated niega toda garantía o responsabilidad por newman uso de esta información. Precauciones en el embarazo: Instrucciones de cuidado - [ Pregnancy Precautions: Care Instructions ] Instrucciones de cuidado No hay dominique manera velarde de prevenir el trabajo de parto antes de la fecha esperada (trabajo de parto prematuro) o de prevenir la mayoría de otros problemas en el [facility]. Jonathan hay cosas que puede hacer para aumentar las probabilidades de tener un embarazo saludable. Vaya a cara citas, siga los consejos de newman médico y cuídese. Coma velasquez y huang ejercicio (si newman médico lo permite). Y asegúrese de erika abundante agua. La atención de seguimiento es dominique parte clave de newman tratamiento y seguridad. Asegúrese de hacer y acudir a todas las citas, y llame a newman médico si está teniendo problemas. También es dominique buena idea saber los resultados de los exámenes y mantener dominique lista de los medicamentos que suzan. Cómo puede cuidarse en el hogar? · Asegúrese de asistir a las citas prenatales. Newman médico le tomará la presión arterial en cada consulta. Newman médico también comprobará si tiene proteínas en newman orina. Tanto la presión arterial lissy gab la presencia de proteínas en la orina son señales de preeclampsia. Esta afección puede ser peligrosa tanto para usted gab para newman bebé. · Deyanira abundantes líquidos, suficientes para que newman orina sea de color amarillo stefania o transparente gab el agua. La deshidratación puede causar contracciones. Si tiene [employer], el corazón o el hígado y tiene que [place] líquidos, hable con newman médico antes de aumentar newman consumo. · Notifique a newman médico de inmediato si presenta cualquier síntoma de infección, tales gab: ¨ Ardor cuando orina. ¨ Flujo con mal olor de la vagina. ¨ Comezón en la vagina. ¨ Fiebre sin explicación. ¨ Dolor o sensibilidad inusual en el útero o la parte baja del abdomen. · Aliméntese en forma equilibrada. Incluya muchos alimentos que [place] ricos en calcio y jessenia. ¨ Entre los alimentos ricos en calcio se incluyen la [place], el queso, el yogur, Catr[place]ita Prows y el brócoli. ¨ Entre los alimentos ricos en jessenia se incluyen las faye jorgensen, los [place], las aves, los [place], los frijoles, las uvas pasas, el pan de grano integral y las verduras de hojas verdes. · No fume. Si necesita ayuda para dejar de fumar, hable con newman médico sobre programas y medicamentos para dejar de fumar. Estos pueden aumentar cara probabilidades de dejar el hábito para siempre. · No judah alcohol ni use drogas ilegales. · Siga las instrucciones de newman médico acerca de la [place]. Newman médico le dirá cuánto ejercicio puede hacer. · Pregúntele a newman médico si puede tener [employer]. Si usted está en riesgo de tener trabajo de [place], newman médico podría pedirle que no tenga relaciones sexuales. · Sellers precauciones para prevenir las caídas. Issa el embarazo las articulaciones están más sueltas y se tiene menos equilibrio. Los deportes tales gab el ciclismo, el esquí o el patinaje en línea pueden aumentar el riesgo de caídas. Y no monte a esteban, ana en motocicleta, huang clavados, huang esquí acuático, bucee, ni salte en paracaídas mientras está embarazada. · Evite calentarse demasiado. No use saunas ni bañeras de hidromasaje. Evite la exposición al sol en climas calientes por mucho tiempo. Sellers acetaminofén (Tylenol) para bajar dominique fiebre lissy. · No tome medicamentos de venta nico, productos herbarios ni suplementos sin hablar pari con newman médico o farmacéutico. 

Cuándo debe pedir ayuda? Llame al 911 en cualquier momento que considere que necesita atención de [place]. Por ejemplo, llame si: 
· Se desmayó (perdió el conocimiento). · Tiene sangrado vaginal intenso. · Tiene dolor intenso en el vientre o la pelvis. · Le sale abundante líquido o gotea de la vagina y sabe o maia que el cordón umbilical se está saliendo a newman vagina.  Si esto sucede, arrodíllese de inmediato, de marisol forma que cara nalgas estén más altas que newman desean. [place] disminuirá la presión sobre el cordón umbilical hasta que llegue la [facility]. Llame a newman médico ahora mismo o busque atención médica inmediata si: · Tiene señales de preeclampsia, tales gab: ¨ Se le hinchan de manera repentina la ronal, las kevin o los pies. ¨ Problemas nuevos con la visión (gab oscurecimiento de la visión o visión borrosa). ¨ Dolor de desean intenso. · Tiene cualquier sangrado vaginal. 
· Tiene dolor abdominal o cólicos. · Tiene fiebre. · Ha tenido contracciones regulares (con o sin dolor) por Verlan Spikes. [place] significa que tiene 8 o más contracciones en 1 hora o que tiene 4 contracciones o más en 20 minutos después de [place] de posición y erika líquidos. · Tiene dominique pérdida repentina de líquido por la vagina. · Tiene dolor en la parte baja de la espalda o presión en la pelvis que no desaparece. · Nota que newman bebé ha dejado de moverse o se mueve mucho menos de lo normal. 
Preste especial atención a los cambios en newman logan y asegúrese de comunicarse con newman médico si tiene algún problema. Dónde puede encontrar más información en inglés? Emmanuel Mathew a [email or web address] Varsha Layer T640 en la búsqueda para aprender más acerca de \"Precauciones en el embarazo: Instrucciones de cuidado - [ Pregnancy Precautions: Care Instructions ]. \" 
Revisado: 16 marzo, 2017 Versión del contenido: 11.3 © 5021-4074 [employer]. Las instrucciones de cuidado fueron adaptadas bajo licencia por [employer] (which disclaims liability or warranty for this information). Si usted tiene [place] afección médica o sobre estas instrucciones, siempre pregunte a newman profesional de logan. Healthwise, Incorporated niega toda garantía o responsabilidad por newman uso de esta información. Conteo de las patadas de newman bebé:  Instrucciones de cuidado - [ Deretha Massed Your Baby's Kicks: Care Instructions ] Instrucciones de cuidado Contar las patadas de newman bebé es dominique manera en la que newman médico puede establecer si newman bebé es saludable. La mayoría de las [facility], sobre todo en el primer embarazo, siente que newman bebé se mueve por primera vez entre las semanas 12 y 25. El movimiento puede sentirse más gab aleteos que gab patadas. Es posible que newman bebé se mueva más a ciertas horas del día. Cuando usted [place], puede notar menos patadas que cuando está descansando. En cara visitas prenatales, newman médico le preguntará si el bebé está activo. En el último trimestre, newman médico le puede pedir que cuente la cantidad de veces que sienta que el bebé se [place]. La atención de seguimiento es dominique parte clave de newman tratamiento y seguridad. Asegúrese de hacer y acudir a todas las citas, y llame a newman médico si está teniendo problemas. También es dominique buena idea saber los resultados de los exámenes y mantener dominique lista de los medicamentos que suzan. Cómo se cuentan las patadas fetales? · Un método común para revisar el movimiento de newman bebé es contar la cantidad de patadas o movimientos que sienta en 1 hora. Es normal sentir 10 movimientos (gab patadas, aleteos o vueltas) en 1 hora. Algunos médicos sugieren que cuente lukas la [facility] llegar a los 10 movimientos. Luego usted puede dejar de contar por ellis día y comenzar otra vez al día siguiente. · Para contar, elija el momento del día en que newman bebé esté más activo. Podría ser cualquier momento entre la mañana y la noche. · Si no siente 10 movimientos en dominique hora, es posible que newman bebé esté durmiendo. Espere hasta la próxima hora y vuelva a contar. Cuándo debe pedir ayuda?  
Llame a newman médico ahora mismo o busque atención médica inmediata si: 
· Siente que newman bebé ha dejado de moverse o se mueve mucho menos de lo normal. 
Preste especial atención a los cambios en newman logan y asegúrese de comunicarse con newman médico si tiene cualquier problema. Dónde puede encontrar más información en inglés? Erika Jane a [email or web address] Anabel Caden Q875 en la búsqueda para aprender más acerca de \"Conteo de las patadas de newman bebé: Instrucciones de cuidado - [ Counting Your Baby's Kicks: Care Instructions ]. \" 
Revisado: 16 marzo, 2017 Versión del contenido: 11.3 © 8902-3379 Healthwise, Incorporated. Las instrucciones de cuidado fueron adaptadas bajo licencia por [employer] (which disclaims liability or warranty for this information). Si usted tiene [place] afección médica o sobre estas instrucciones, siempre pregunte a newman profesional de logan. Healthwise, Incorporated niega toda garantía o responsabilidad por newman uso de esta información. Chart Review Routing History No Routing History on File

## 2017-08-20 ENCOUNTER — HOSPITAL ENCOUNTER (INPATIENT)
Age: 32
LOS: 4 days | Discharge: HOME | DRG: 540 | End: 2017-08-24
Attending: OBSTETRICS & GYNECOLOGY | Admitting: OBSTETRICS & GYNECOLOGY
Payer: MEDICAID

## 2017-08-20 PROCEDURE — 99284 EMERGENCY DEPT VISIT MOD MDM: CPT

## 2017-08-20 PROCEDURE — 59025 FETAL NON-STRESS TEST: CPT

## 2017-08-20 PROCEDURE — 65270000029 HC RM PRIVATE

## 2017-08-20 PROCEDURE — 4A1HXCZ MONITORING OF PRODUCTS OF CONCEPTION, CARDIAC RATE, EXTERNAL APPROACH: ICD-10-PCS | Performed by: OBSTETRICS & GYNECOLOGY

## 2017-08-20 NOTE — IP AVS SNAPSHOT
Zahra Lópezkervin 
 
 
 [address] 
[phone] Patient: Matias Raphael MRN: [id number] You are allergic to the following No active allergies Immunizations Administered for This Admission Name Date Tdap 8/24/2017 Recent Documentation Height Weight Breastfeeding? BMI OB Status Smoking Status 1.575 m 68 kg Yes 27.44 kg/m2 Recent pregnancy Never Smoker Emergency Contacts Name Discharge Info Relation Home Work Mobile [address] Patrick [3] [phone] Kenrick Gonsalez  Spouse [3] [phone] About your hospitalization You were admitted on:  August 20, 2017 You last received care in the:  [address] [facility] You were discharged on:  August 24, 2017 Unit phone number:  [phone] Why you were hospitalized Your primary diagnosis was:  Not on File Your diagnoses also included:  Normal Labor Providers Seen During Your Hospitalizations Provider Role Specialty Primary office phone Norman Hassan MD Attending Provider Gynecology [phone] Drew Lo MD Attending Provider Obstetrics & Gynecology [phone] Your Primary Care Physician (PCP) Primary Care Physician Office Phone Office Fax UNKNOWN, PROVIDER ** None ** ** None ** Follow-up Information Follow up With Details Comments Contact Info Drew Lo MD In 6 weeks call office to schedule an appointment to be seen in 6 weeks [address] 
[phone] Provider Unknown   Patient not available to ask Current Discharge Medication List  
  
START taking these medications Dose & Instructions Dispensing Information Comments Morning Noon Evening Bedtime HYDROcodone-acetaminophen 7.5-325 mg per tablet Commonly known as:  Lionel Heath Your last dose was: Your next dose is:    
   
   
 Dose:  1 Tab Take 1 Tab by mouth every four (4) hours as needed. Max Daily Amount: 6 Tabs. Quantity:  30 Tab Refills:  0  
     
   
   
   
  
 ibuprofen 800 mg tablet Commonly known as:  MOTRIN Your last dose was: Your next dose is:    
   
   
 Dose:  800 mg Take 1 Tab by mouth every eight (8) hours as needed. Quantity:  30 Tab Refills:  0 CONTINUE these medications which have NOT CHANGED Dose & Instructions Dispensing Information Comments Morning Noon Evening Bedtime COLACE 100 mg capsule Generic drug:  docusate sodium Your last dose was: Your next dose is:    
   
   
 Dose:  100 mg Take 100 mg by mouth daily. Refills:  0  
     
   
   
   
  
 ferrous sulfate 325 mg (65 mg iron) tablet Your last dose was: Your next dose is:    
   
   
 Dose:  325 mg Take 325 mg by mouth daily. Refills:  0 PRENATAL #2 PO Your last dose was: Your next dose is: Take  by mouth. Refills:  0 Where to Get Your Medications These medications were sent to [address] [place] [address], [address] [place] [id number] Phone:  [phone]  
  ibuprofen 800 mg tablet Information on where to get these meds will be given to you by the nurse or doctor. ! Ask your nurse or doctor about these medications HYDROcodone-acetaminophen 7.5-325 mg per tablet Discharge Instructions Parto por cesárea: Ash Cueva en karina bobby - [  Section: What to Expect at [facility] ] Pena recuperación Un parto por cesárea, o simplemente cesárea, es dominique cirugía mediante la cual se da a inez a un bebé a través de un cody, llamado incisión, que hace el médico en la parte baja del abdomen y [place]. Es posible que sienta dolor en la parte baja del abdomen y que necesite analgésicos (medicamentos para el dolor) lukas 1 o 2 semanas. Puede esperar tener algo de sangrado vaginal lukas varias semanas. Es probable que necesite unas 6 semanas para recuperarse completamente. Es importante que se tome las cosas con calma mientras augusto la incisión. Evite levantar objetos pesados, hacer actividades vigorosas o hacer ejercicios que pudieran esforzar los músculos abdominales mientras se recupera. Pídale a un familiar o amigo que la ayude con las tareas domésticas, la comida y las compras. Esta hoja de [employer] idea general del tiempo que le llevará recuperarse. Sin embargo, cada persona se recupera a un ritmo diferente. Siga los pasos que se mencionan a continuación para recuperarse lo más rápido posible. Cómo puede cuidarse en el hogar? Actividad · Descanse cuando se sienta cansada. Dormir lo suficiente la ayudará a recuperarse. · Intente caminar todos los días. Comience caminando un poco más de lo que caminó el día anterior. Poco a poco, aumente la distancia. Caminar mejora el flujo de [place] y [place] a prevenir la neumonía, el estreñimiento y los coágulos de [place]. · Evite las actividades intensas, gab montar en bicicleta, trotar, levantar pesas y hacer ejercicios aeróbicos lukas 6 semanas o hasta que newman médico lo apruebe. · Hasta que newman médico lo apruebe, no levante nada más pesado que newman bebé. · No huang abdominales ni ningún otro ejercicio que [employer] músculos del abdomen lukas 6 semanas o hasta que newman médico lo apruebe. · Sostenga dominique almohada sobre la incisión al toser o respirar profundamente. Janalee Gracey apoyo a newman abdomen y reducirá el dolor. · Puede ducharse gab de costumbre. Seque la incisión con toques suaves de toalla cuando termine. · Tendrá algo de sangrado vaginal. Use toallas sanitarias.  No se huang lavados vaginales ni use tampones hasta que el médico se lo permita. · Pregúntele a newman médico cuándo puede volver a conducir. · Es probable que necesite ausentarse del trabajo por lo menos 6 semanas. [place] dependerá del tipo de trabajo que huang y de cómo se sienta. · Pregúntele a newman médico cuándo puede tener [employer]. Alimentación · Puede continuar con newman dieta normal. Si tiene [employer], coma alimentos suaves bajos en grasa, gab arroz sin condimentar, manuel a la bre, pan haim y yogur. · Deyanira abundantes líquidos (a menos que newman médico le indique lo contrario). · Podría notar que no evacua el intestino con regularidad dominique después de la [place]. [place] es común. Trate de evitar el estreñimiento y no hacer esfuerzos cuando evacua el intestino. [place] vez desee erika un suplemento de TRiQ. Si no ha evacuado el intestino después de un par de días, pregúntele a newman médico si puede erika un laxante suave. · Si está amamantando, no deyanira alcohol. Medicamentos · Newman médico le dirá si puede volver a erika cara medicamentos y cuándo puede volver a hacerlo. También le dará indicaciones sobre cualquier medicamento nuevo que deba erika usted. · Si suzan medicamentos que previenen la formación de coágulos de [place], gab warfarina (Coumadin), clopidogrel (Plavix) o aspirina, asegúrese de hablar con newman médico. Él o rogerio le dirá si debe volver a erika estos medicamentos y en qué momento. Asegúrese de que entiende exactamente lo que el médico quiere que huang. · [employer] analgésicos (medicamentos para el dolor) exactamente gab le fueron indicados. ¨ Si el médico le recetó un analgésico, tómelo según las indicaciones. ¨ Si no está tomando un analgésico recetado, pregúntele a newman médico si puede erika hortencia de [employer]. · Si le parece que el analgésico le está produciendo malestar estomacal: [address] comidas (a menos que newman [employer] haya indicado lo contrario). ¨ Pídale al médico un analgésico diferente. · Si newman médico le recetó antibióticos, tómelos según las indicaciones. No deje de tomarlos por el hecho de sentirse mejor. Debe erika todos los antibióticos hasta terminarlos. Cuidado de la incisión · Si tiene tiras de cinta [employer] incisión, déjeselas puestas lukas dominique semana o hasta que se caigan por sí solas. · Lave la magda a diario con [place] tibia y séquela con toques suaves de toalla. No use peróxido de hidrógeno [place]) o alcohol porque pueden retrasar la sanación. Podría cubrir la magda con dominique venda de gasa si supura o roza contra la ropa. Cambie la venda todos los días. · Mantenga la magda limpia y seca. Otras instrucciones · Si amamanta a newman bebé, marisol vez le resulte más cómodo, lukas el proceso de sanación, sostener al bebé de [place] en la que no se apoye en newman abdomen. Intente poner a newman bebé debajo del brazo, con el cuerpo del lado que lo [place] a amamantar. Sostenga la parte superior del cuerpo de newman bebé con newman Cherelle Romp. Con elbert mano usted puede controlar la desean de newman bebé para acercarle la boca a newman seno. Heil se conoce a veces gab \"posición de balón de fútbol americano\". La atención de seguimiento es dominique parte clave de newman tratamiento y seguridad. Asegúrese de hacer y acudir a todas las citas, y llame a newman médico si está teniendo problemas. También es dominique buena idea saber los resultados de cara exámenes y mantener dominique lista de los medicamentos que suzan. Cuándo debe pedir ayuda? Llame al 911 en cualquier momento que considere que necesita atención de [place]. Por ejemplo, llame si: 
· Se desmayó (perdió el conocimiento). · Tiene síntomas de un coágulo de jacinto en el pulmón (llamado embolia pulmonar). Estos pueden incluir: ¨ Dolor repentino en el pecho. ¨ Dificultad para respirar. ¨ Toser jacinto. · Piensa en hacerse daño a sí misma o en lastimar a newman bebé o a otra persona. Llame a newman médico ahora mismo o busque atención médica inmediata si: · Tiene sangrado vaginal intenso. [place] significa que empapa dominique toalla sanitaria cada hora lukas 2 horas o más. · Se siente mareada o aturdida, o gab si fuera a desmayarse. · Tiene dolor abdominal nuevo o que empeora. · Tiene puntos de sutura flojos o se le abre la incisión. · Tiene síntomas de infección, tales gab: ¨ Aumento del dolor, la hinchazón, el enrojecimiento o la temperatura. ¨ Vetas rojizas que salen de la incisión. ¨ Pus que supura de la incisión. Fayne Blakes. · Tiene síntomas de un coágulo de jacinto en la pierna (llamado trombosis venosa profunda), tales gab: ¨ Dolor en la pantorrilla, el muslo, la ezio o detrás de la rodilla. ¨ Enrojecimiento e hinchazón en la pierna o la ezio. Preste especial atención a los cambios en newman logan y asegúrese de comunicarse con newman médico si: 
· Se siente ashely, ansiosa o desesperanzada lukas más de unos días. · No mejora gab se esperaba. Dónde puede encontrar más información en inglés? Myron Edward a [email or web address].[email or web address] Lulú Spotted V909 en la búsqueda para aprender más acerca de \"Parto por cesárea: Exbettie Patricia en el hogar - [  Section: What to Expect at [facility] ]. \" 
Revisado: 2017 Versión del contenido: 11.3 © 3681-0064 [employer], [employer]. Las instrucciones de cuidado fueron adaptadas bajo licencia por Good [employer] Connections (which disclaims liability or warranty for this information). Si usted tiene [place] Ferrum afección médica o sobre estas instrucciones, siempre pregunte a newman profesional de logan. [facility] niega toda garantía o responsabilidad por newman uso de esta información. Después del parto (período de posparto): Instrucciones de cuidado - [ After Your Delivery (the Postpartum Period): Care Instructions ] Instrucciones de cuidado Felicidades por el nacimiento de newman bebé.  Al igual que el Sweetie, [address] [facility] [address] tiempo con el recién nacido puede ser un momento de entusiasmo, alegría y agotamiento. Es posible que se sienta collado al mirar la jaya de newman pequeño bebé. También podría sentirse abrumada por newman nuevo ritmo de sueño y las nuevas responsabilidades. Al principio, los bebés suelen dormir lukas el día y permanecen despiertos lukas la noche. No tienen ningún patrón ni rutina. Podrían luis a gritos ahogados, sacudirse y despertarse, o parecer gab si tuvieran los ojos cruzados (bizcos). Todo esto es normal, e incluso la pueden hacer sonreír. Lukas las primeras semanas siguientes al parto, trate de cuidarse velasquez. Podría tardar de 4 a 6 semanas en volver a sentirse usted misma, y posiblemente más tiempo si le patterson hecho dominique cesárea. Es probable que se sienta muy fatigada lukas varias semanas. Thalia días estarán llenos de Fran, dino también de mucha alegría. La atención de seguimiento es dominique parte clave de newman tratamiento y seguridad. Asegúrese de hacer y acudir a todas las citas, y llame a newman médico si está teniendo problemas. También es dominique buena idea saber los resultados de los exámenes y mantener dominique lista de los medicamentos que suzan. Cómo puede cuidarse en el hogar? Cuide newman cuerpo después del parto · Utilice toallas sanitarias en vez de tampones para las pérdidas de jacinto que podrían durar hasta 2 semanas. · Alivie los cólicos con ibuprofeno (Advil, Motrin). · Alivie el dolor de las hemorroides y la magda entre la vagina y el recto con compresas de hielo o de infusión de kari Kincaid (\"witch hazel\"). · Alivie el estreñimiento bebiendo abundante líquido y comiendo alimentos ricos en fibra. Pregúntele a newman médico acerca de los ablandadores de heces de [place]. · Límpiese con un chorrito suave de agua tibia de dominique botella en vez de hacerlo con papel higiénico. 
· Sorrento un baño de asiento en agua tibia varias veces al día. · Use un buen sostén de lactancia. Alivie el dolor y la hinchazón de los senos con toallitas de aseo húmedas tibias. · Si no está amamantando, utilice hielo en lugar de calor para aliviar el dolor de los senos. · Si está amamantando, newman período menstrual podría no comenzar hasta después de varios meses. Es posible que Antonio, y más tiempo al principio, de gab lo hacía antes del [facility]. · Espere hasta que haya sanado (de 4 a 6 semanas) antes de volver a tener relaciones sexuales. Newman médico le dirá cuándo puede tener relaciones sexuales. · Trate de no viajar con el bebé lukas las primeras 5 o 6 semanas. Si hace un viaje lynette en automóvil, huang paradas frecuentes para caminar y estirarse. Evite el agotamiento · Descanse todos los días. Trate de dormir la siesta cuando newman bebé también lo hace. · Pídale a otro adulto que la acompañe por unos joselyn después del [place]. · Planifique el cuidado de los niños si tiene otros hijos. · Sea flexible para que pueda comer a horas fuera de lo común y dormir cuando lo necesite. Tanto usted gab newman bebé están creando horarios nuevos. · Planee pequeñas salidas para estar afuera de la casa. El cambio podría hacer que se sienta menos fatigada. · Pida ayuda para cocinar y [address] hogar y las compras. Recuerde que newman principal tarea consiste en cuidar a newman bebé. Conozca la ayuda que puede recibir en aysha de tener depresión posparto · La depresión posparto es común lukas las primeras 1 a 2 semanas siguientes al nacimiento. Podría llorar o sentirse ashely o irritable sin razón alguna. · Descanse cada vez que pueda hacerlo. Estar fatigada le dificulta manejar cara emociones. · Salga a caminar con newman bebé. · Hable con newman danial, cara amigos y cara familiares acerca de cara sentimientos.  
· Si cara síntomas woo más de unas pocas semanas, o si se siente muy deprimida, pídale ayuda a newman médico. 
 · La depresión posparto puede tratarse. Los grupos de apoyo y la asesoría psicológica pueden ser de [place]. A veces, los medicamentos también pueden ayudar. Manténgase saludable · Coma alimentos sanos para tener más energía, producir dominique buena [place] materna y adelgazar las libras adicionales que engordó con el bebé. · Si amamanta, evite el alcohol y las drogas. No fume. Si dejó de fumar lukas el embarazo, felicitaciones. · Inicie ejercicios diarios después de 4 a 6 semanas, dino descanse cuando se sienta fatigada. · Aprenda ejercicios para tonificar el abdomen. Ezra los ejercicios de Kegel para recuperar la fuerza de los músculos pélvicos. Puede hacer los ejercicios de Kegel mientras está de pie o sentada. ¨ Apriete los mismos músculos que usted usaría para detener la [place]. El abdomen y los muslos no deben moverse. ¨ Manténgalos apretados lukas 3 segundos y, luego, relájelos otros 3 segundos. ¨ Empiece con 3 segundos. Marchia Prudent, añada 1 bethel cada semana hasta que sea capaz de apretar lukas 10 segundos. ¨ Repita el ejercicio entre 10 y 13 veces cada sesión. Ezra muna o más sesiones cada día. · Busque dominique clase para nuevas madres y recién nacidos que tenga un tiempo de ejercicio. · Si le patterson hecho dominique cesárea, dese un poco más de tiempo antes de hacer ejercicio, y tenga cuidado. Cuándo debe pedir ayuda? Llame al 911 en cualquier momento que considere que necesita atención de [place]. Por ejemplo, llame si: 
· Se desmayó (perdió el conocimiento). Llame a newman médico ahora mismo o busque atención médica inmediata si: · Tiene sangrado vaginal intenso. [place] significa que está expulsando coágulos sanguíneos y empapando dominique toalla sanitaria cada hora por 2 horas o más. · Está mareada o aturdida, o siente gab que se puede [address] [facility] 15. · Tiene fiebre. · Tiene dolor abdominal nuevo o que empeora.  
Preste especial atención a los cambios en newman logan y asegúrese de comunicarse con newman médico si: 
 · Pena sangrado vaginal parece volverse más intenso. · Tiene flujo vaginal nuevo o que empeora. · Se siente ashely, ansiosa o sin esperanzas lukas más de unos pocos días. · No mejora gab se esperaba. Dónde puede encontrar más información en inglés? Duke Mott a [email or web address] Wilberto Barron W680 en la búsqueda para aprender más acerca de \"Después del parto (período de posparto): Instrucciones de cuidado - [ After Your Delivery (the Postpartum Period): Care Instructions ]. \" 
Revisado: 16 marzo, 2017 Versión del contenido: 11.3 © 1591-2383 Healthwise, Incorporated. Las instrucciones de cuidado fueron adaptadas bajo licencia por Good Help Connections (which disclaims liability or warranty for this information). Si usted tiene [place] afección médica o sobre estas instrucciones, siempre pregunte a pena profesional de logan. Healthwise, Incorporated niega toda garantía o responsabilidad por pena uso de esta información. DISCHARGE SUMMARY from Nurse The following personal items are in your possession at time of discharge: 
 
Dental Appliances: None Visual Aid: None Home Medications: None Jewelry: None Clothing: At bedside Other Valuables: Altagracia Tamez [address], JACQUELINE Marinelli PATIENT INSTRUCTIONS: 
 
After general anesthesia or intravenous sedation, for 24 hours or while taking prescription Narcotics: · Limit your activities · Do not drive and operate hazardous machinery · Do not make important personal or business decisions · Do  not drink alcoholic beverages · If you have not urinated within 8 hours after discharge, please contact your surgeon on call. Report the following to your surgeon: 
· Excessive pain, swelling, redness or odor of or around the surgical area · Temperature over 100.5 · Nausea and vomiting lasting longer than 4 hours or if unable to take medications · Any signs of decreased circulation or nerve impairment to extremity: change in color, persistent  numbness, tingling, coldness or increase pain · Any questions What to do at Home: 
Recommended activity: 
 
Discharge instruction to follow: Activity: Pelvis rest for 6 weeks No heavy lifting over 15 lbs for 2 weeks No driving for 2 weeks No push/pull motion such as sweeping or vacuuming for 2 weeks No tub baths for 6 weeks  section keep incision clean and dry, may shower as normal with soap and water. Inspect incision every day for signs of infection listed below. Continue to use klaudia-bottle with every void or bowel movement until comfortable stopping. Change sanitary pad after each urination or bowel movement. Call MD for the following: 
    Fever over 101 F; pain not relieved by medication; foul smelling vaginal discharge or increase in vaginal bleeding. Redness, swelling, or drainage from  incision. Take medication as prescribed. Follow up with MD as order. *  Please give a list of your current medications to your Primary Care Provider. *  Please update this list whenever your medications are discontinued, doses are 
    changed, or new medications (including over-the-counter products) are added. *  Please carry medication information at all times in case of emergency situations. These are general instructions for a healthy lifestyle: No smoking/ No tobacco products/ Avoid exposure to second hand smoke Surgeon General's Warning:  Quitting smoking now greatly reduces serious risk to your health. Obesity, smoking, and sedentary lifestyle greatly increases your risk for illness A healthy diet, regular physical exercise & weight monitoring are important for maintaining a healthy lifestyle You may be retaining fluid if you have a history of heart failure or if you experience any of the following symptoms:  Weight gain of 3 pounds or more overnight or 5 pounds in a week, increased swelling in our hands or feet or shortness of breath while lying flat in bed. Please call your doctor as soon as you notice any of these symptoms; do not wait until your next office visit. Recognize signs and symptoms of STROKE: 
 
F-face looks uneven A-arms unable to move or move unevenly S-speech slurred or non-existent T-time-call 911 as soon as signs and symptoms begin-DO NOT go Back to bed or wait to see if you get better-TIME IS BRAIN. Warning Signs of HEART ATTACK Call 911 if you have these symptoms: 
? Chest discomfort. Most heart attacks involve discomfort in the center of the chest that lasts more than a few minutes, or that goes away and comes back. It can feel like uncomfortable pressure, squeezing, fullness, or pain. ? Discomfort in other areas of the upper body. Symptoms can include pain or discomfort in one or both arms, the back, neck, jaw, or stomach. ? Shortness of breath with or without chest discomfort. ? Other signs may include breaking out in a cold sweat, nausea, or lightheadedness. Don't wait more than five minutes to call [address]! Fast action can save your life. Calling 911 is almost always the fastest way to get lifesaving treatment. Emergency Medical Services staff can begin treatment when they arrive  up to an hour sooner than if someone gets to the hospital by car. The discharge information has been reviewed with the patient. The patient verbalized understanding. Discharge medications reviewed with the patient and appropriate educational materials and side effects teaching were provided. Discharge Orders None [facility] Announcement We are excited to announce that we are making your provider's discharge notes available to you in [facility].   You will see these notes when they are completed and signed by the physician that discharged you from your recent hospital stay. If you have any questions or concerns about any information you see in MyChart, please call the Health Information Department where you were seen or reach out to your Primary Care Provider for more information about your plan of care. Introducing \[facility] [place] [facility]\"" HEALTH SERVICES! Emile Rondon introduce portal paciente MyChart . Ahora se puede acceder a partes de newman expediente médico, enviar por correo electrónico la oficina de newman médico y solicitar renovaciones de medicamentos en línea. En newman navegador de Internet , Alexandria Aguilari a [email or web address] Huang clic en el usuario por Kathrin Comber? Erika Halon clic aquí en la sesión Loanne . Verá la página de registro Harvey. Ingrese newman código de [employer] marisol y gab aparece a continuación. Usted no tendrá que [employer] código después de belia completado el proceso de registro . Si usted no se inscribe antes de la fecha de caducidad , debe solicitar un nuevo código. · MyChart Código de acceso : [id number] Expires: 9/5/2017  4:26 PM 
 
Ingresa los últimos cuatro dígitos de newman Número de Seguro Social ( xxxx ) y fecha de nacimiento ( dd / mm / aaaa ) gab se indica y huang clic en Enviar. ted será llevado a la siguiente página de registro . Crear un ID MyChart . Esta será newman ID de inicio de sesión de MyChart y no puede ser [place] , por lo que pensar en dominique que es [place] y fácil de recordar . Crear dominique contraseña MyChart . Usted puede cambiar newman contraseña en cualquier momento . Ingrese newman Password Reset de preguntas y [place] . [place] se puede utilizar en un momento posterior si usted olvida newman contraseña. Introduzca newman dirección de correo electrónico . Liudmila Quale recibirá dominique notificación por correo electrónico cuando la nueva información está disponible en MyChart . August Chilango kearney en Registrarse.  Thelma Padron rama y descargar porciones de newman expediente Summer kearney en el enlace de descarga del menú Resumen para descargar Lis Freire copia portátil de pena información médica . Si tiene [employer] , por favor visite la sección de preguntas frecuentes del sitio web MyChart . Fatemeh Short NO es que se utilizará para las necesidades urgentes. Para emergencias médicas , llame al 911 . Ahora disponible en pena iPhone y Android ! General Information Please provide this summary of care documentation to your next provider. Patient Signature:  ____________________________________________________________ Date:  ____________________________________________________________  
  
[facility] Provider Signature:  ____________________________________________________________ Date:  ____________________________________________________________  
  
  
   
  
Rafy Reed [address] [facility] 
[phone] Patient: Mikhail Quintana MRN: [id number] PCE:[birth year] Tiene alergias a lo siguiente No tiene mararnick Parada Administradas en esta admisión:    
   
 Dajuan Ambrocio Tdap 8/24/2017 Documentación recientes Height Weight Está amamantando? BMI [facility]) Estado obstétrico Estatus de tabaquísmo 1.575 m 68 kg Yes 27.44 kg/m2 Recent pregnancy Never Smoker Emergency Contacts Name Discharge Info Relation Home Work Mobile [address] [3] [phone] Keyonna Martinez  Spouse [3] [phone] Sobre pena hospitalización Le admitieron el:  August 20, 2017 Pena tratamiento más reciente fue el:  SFE 4 MOTHER INFANT Le dieron de lissy el:  August 24, 2017 Número de teléfono de la unidad:  [phone] Por qué le ingresaron Pena diagnosis primaria es:  No está archivado/a Pena diagnosis también incluye:  Normal Labor Proveedores de verse lukas cara hospitalizaciones Personal Médico Rol Especialidad Teléfono principal de la oficina Jeffrey Jones MD Attending Provider Gynecology [phone] Olene Boxer, MD Attending Provider Obstetrics & Gynecology [phone] Pena médico de atención primaria (PCP ) Primary Care Physician Office Phone Office Fax UNKNOWN, PROVIDER ** None ** ** None ** Follow-up Information Follow up With Details Comments Contact Info Olene Boxer, MD In 6 weeks call office to schedule an appointment to be seen in 6 weeks [address] 
[phone] Provider Unknown   Patient not available to ask Aprobación de la gestión actual lista de medicamentos EMPIECE a erika abcdexperts Dosis e instrucciones Información de dispensación Comentarios Morning Noon Evening Bedtime HYDROcodone-acetaminophen 7.5-325 mg per tablet También conocido gab:  Andree Gomez Your last dose was: Your next dose is:    
   
   
 Dosis:  1 Tab Take 1 Tab by mouth every four (4) hours as needed. Max Daily Amount: 6 Tabs. cantidad:  30 Tab  
recargas:  0  
     
   
   
   
  
 ibuprofen 800 mg tablet También conocido gab:  MOTRIN Your last dose was: Your next dose is:    
   
   
 Dosis:  800 mg Take 1 Tab by mouth every eight (8) hours as needed. cantidad:  30 Tab  
recargas:  0 CONTINUAR estos medicamentos que no [place] Dosis e instrucciones Información de dispensación Comentarios Morning Noon Evening Bedtime COLACE 100 mg capsule Medicamento genérico:  docusate sodium Your last dose was: Your next dose is:    
   
   
 Dosis:  100 mg Take 100 mg by mouth daily. recargas:  0  
     
   
   
   
  
 ferrous sulfate 325 mg (65 mg iron) tablet Your last dose was: Your next dose is:    
   
   
 Dosis:  325 mg Take 325 mg by mouth daily. recargas:  0  PRENATAL #2 PO  
   
 Your last dose was: Your next dose is: Take  by mouth. recargas:  0 Dónde puede recoger cara medicamentos Estos medicamentos fueron enviados a [address], [address] [facility] [address] [address] Teléfono:  [phone]  
  ibuprofen 800 mg tablet Información sobre dónde obtener estos medicamentos se le dará a usted por la enfermera o el médico.   
 ! Pregunte a newman médico o enfermero/a sobre [employer] HYDROcodone-acetaminophen 7.5-325 mg per tablet Discharge Instructions Parto por cesárea: Shad Chavarria en el \[facility]\"" - [  Section: What to Expect at [facility] ] Newman recuperación Un parto por cesárea, o simplemente cesárea, es dominique cirugía mediante la cual se da a inez a un bebé a través de un cody, llamado incisión, que hace el médico en la parte baja del abdomen y [place]. Es posible que sienta dolor en la parte baja del abdomen y que necesite analgésicos (medicamentos para el dolor) lukas 1 o 2 semanas. Puede esperar tener algo de sangrado vaginal lukas varias semanas. Es probable que necesite unas 6 semanas para recuperarse completamente. Es importante que se tome las cosas con calma mientras augusto la incisión. Evite levantar objetos pesados, hacer actividades vigorosas o hacer ejercicios que pudieran esforzar los músculos abdominales mientras se recupera. Pídale a un familiar o amigo que la ayude con las tareas domésticas, la comida y las compras. Esta hoja de [employer] idea general del tiempo que le llevará recuperarse. Sin embargo, cada persona se recupera a un ritmo diferente. Siga los pasos que se mencionan a continuación para recuperarse lo más rápido posible. Cómo puede cuidarse en el hogar? Actividad · Descanse cuando se sienta cansada. Dormir lo suficiente la ayudará a recuperarse. · Intente caminar todos los días. Comience caminando un poco más de lo que caminó el día anterior. Poco a poco, aumente la distancia. Caminar mejora el flujo de [address] y [place] a prevenir la neumonía, el estreñimiento y los coágulos de [address] · Evite las actividades intensas, gab montar en bicicleta, trotar, levantar pesas y hacer ejercicios aeróbicos lukas 6 semanas o hasta que newman médico lo apruebe. · Hasta que newman médico lo apruebe, no levante nada más pesado que newman bebé. · No huang abdominales ni ningún otro ejercicio que [employer] músculos del abdomen lukas 6 semanas o hasta que newman médico lo apruebe. · Sostenga dominique almohada sobre la incisión al toser o respirar profundamente. Theador Nip apoyo a newman abdomen y reducirá el dolor. · Puede ducharse gab de costumbre. Seque la incisión con toques suaves de toalla cuando termine. · Tendrá algo de sangrado vaginal. Use toallas sanitarias. No se huang lavados vaginales ni use tampones hasta que el médico se lo permita. · Pregúntele a newman médico cuándo puede volver a conducir. · Es probable que necesite ausentarse del trabajo por lo menos 6 semanas. Narcissa dependerá del tipo de trabajo que huang y de cómo se sienta. · Pregúntele a newman médico cuándo puede tener [employer]. Alimentación · Puede continuar con newman dieta normal. Si tiene [employer], coma alimentos suaves bajos en grasa, gab arroz sin condimentar, manuel a la bre, pan haim y yogur. · Deyanira abundantes líquidos (a menos que newman médico le indique lo contrario). · Podría notar que no evacua el intestino con regularidad dominique después de la [place]. Narcissa es común. Trate de evitar el estreñimiento y no hacer esfuerzos cuando evacua el intestino. [place] vez desee erika un suplemento de [employer] [facility]. Si no ha evacuado el intestino después de un par de días, pregúntele a newman médico si puede erika un laxante suave. · Si está amamantando, no deyanira alcohol. Medicamentos · Newman médico le dirá si puede volver a erika cara medicamentos y cuándo puede volver a hacerlo. También le dará indicaciones sobre cualquier medicamento nuevo que deba erika usted. · Si suzan medicamentos que previenen la formación de coágulos de [place], gab warfarina (Coumadin), clopidogrel (Plavix) o aspirina, asegúrese de hablar con newman médico. Él o rogerio le dirá si debe volver a erika estos medicamentos y en qué momento. Asegúrese de que entiende exactamente lo que el médico quiere que huang. · [employer] analgésicos (medicamentos para el dolor) exactamente gab le fueron indicados. ¨ Si el médico le recetó un analgésico, tómelo según las indicaciones. ¨ Si no está tomando un analgésico recetado, pregúntele a newman médico si puede erika hortencia de [employer]. · Si le parece que el analgésico le está produciendo malestar estomacal: [address] comidas (a menos que newman médico le haya indicado lo contrario). ¨ Pídale al médico un analgésico diferente. · Si newman médico le recetó antibióticos, tómelos según las indicaciones. No deje de tomarlos por el hecho de sentirse mejor. Debe erika todos los antibióticos hasta terminarlos. Cuidado de la incisión · Si tiene tiras de cinta [employer] incisión, déjeselas puestas lukas dominique semana o hasta que se caigan por sí solas. · Lave la magda a diario con [place] tibia y séquela con toques suaves de toalla. No use peróxido de hidrógeno [employer]) o alcohol porque pueden retrasar la sanación. Podría cubrir la magda con dominique venda de gasa si supura o roza contra la ropa. Cambie la venda todos los días. · Mantenga la magda limpia y seca. Otras instrucciones · Si amamanta a newman bebé, marisol vez le resulte más cómodo, lukas el proceso de sanación, sostener al bebé de [place] en la que no se apoye en newman abdomen.  Intente poner a newman bebé debajo del brazo, con el cuerpo del lado que lo Annemarie Salvage a amamantar. Sostenga la parte superior del cuerpo de newman bebé con newman Joel Copper. Con elbert mano usted puede controlar la desean de newman bebé para acercarle la boca a newman seno. [place] se conoce a veces gab \"posición de balón de fútbol americano\". La atención de seguimiento es dominique parte clave de newman tratamiento y seguridad. Asegúrese de hacer y acudir a todas las citas, y llame a newman médico si está teniendo problemas. También es dominique buena idea saber los resultados de cara exámenes y mantener dominique lista de los medicamentos que suzan. Cuándo debe pedir ayuda? Llame al 911 en cualquier momento que considere que necesita atención de [place]. Por ejemplo, llame si: 
· Se desmayó (perdió el conocimiento). · Tiene síntomas de un coágulo de jacinto en el pulmón (llamado embolia pulmonar). Estos pueden incluir: ¨ Dolor repentino en el pecho. ¨ Dificultad para respirar. ¨ Toser jacinto. · Piensa en hacerse daño a sí misma o en lastimar a newman bebé o a otra persona. Llame a newman médico ahora mismo o busque atención médica inmediata si: · Tiene sangrado vaginal intenso. [place] significa que empapa dominique toalla sanitaria cada hora lukas 2 horas o más. · Se siente mareada o aturdida, o gab si fuera a desmayarse. · Tiene dolor abdominal nuevo o que empeora. · Tiene puntos de sutura flojos o se le abre la incisión. · Tiene síntomas de infección, tales gab: ¨ Aumento del dolor, la hinchazón, el enrojecimiento o la temperatura. ¨ Vetas rojizas que salen de la incisión. ¨ Pus que supura de la incisión. Tonie Tonya. · Tiene síntomas de un coágulo de jacinto en la pierna (llamado trombosis venosa profunda), tales gab: ¨ Dolor en la pantorrilla, el muslo, la ezio o detrás de la rodilla. ¨ Enrojecimiento e hinchazón en la pierna o la ezio. Preste especial atención a los cambios en newman logan y asegúrese de comunicarse con newman médico si: 
· Se siente ashely, ansiosa o desesperanzada lukas más de unos días. · No mejora gab se esperaba. Dónde puede encontrar más información en inglés? Barbara Mckeon a http://zahra-sravani.info/. Archana Sewell [id number] en la búsqueda para aprender más acerca de \"Parto por cesárea: Javed Nieves en el \[facility]\"" - [  Section: What to Expect at [facility] ]. \" 
Revisado: 2017 Versión del contenido: 11.3 © 5966-2407 Healthwise, Incorporated. Las instrucciones de cuidado fueron adaptadas bajo licencia por [employer] (which disclaims liability or warranty for this information). Si usted tiene [place] afección médica o sobre estas instrucciones, siempre pregunte a newman profesional de logan. Healthwise, Incorporated niega toda garantía o responsabilidad por newman uso de esta información. Después del parto (período de posparto): Instrucciones de cuidado - [ After Your Delivery (the Postpartum Period): Care Instructions ] Instrucciones de cuidado Felicidades por el nacimiento de newman bebé. Al igual que el Sweetie, el tiempo con el recién nacido puede ser un momento de [place], Marrion Bash y agotamiento. Es posible que se sienta collado al mirar la jaya de newman pequeño bebé. También podría sentirse abrumada por newman nuevo ritmo de sueño y las nuevas responsabilidades. Al principio, los bebés suelen dormir lukas el día y permanecen despiertos lukas la noche. No tienen ningún patrón ni rutina. Podrían luis a gritos ahogados, sacudirse y despertarse, o parecer gab si tuvieran los ojos cruzados (bizcos). Todo esto es normal, e incluso la pueden hacer sonreír. Lukas las primeras semanas siguientes al parto, trate de cuidarse velasquez. Podría tardar de 4 a 6 semanas en volver a sentirse usted misma, y posiblemente más tiempo si le patterson hecho dominique cesárea. Es probable que se sienta muy fatigada lukas varias semanas. Thalia días estarán llenos de Fran, dino también de mucha alegría.  
Beola Pattee de seguimiento es dominique parte clave de newman tratamiento y seguridad. Asegúrese de hacer y acudir a todas las citas, y llame a newman médico si está teniendo problemas. También es dominique buena idea saber los resultados de los exámenes y mantener dominique lista de los medicamentos que suzan. Cómo puede cuidarse en el hogar? Cuide newman cuerpo después del parto · Utilice toallas sanitarias en vez de tampones para las pérdidas de jacinto que podrían durar hasta 2 semanas. · Alivie los cólicos con ibuprofeno (Advil, Motrin). · Alivie el dolor de las hemorroides y la magda entre la vagina y el recto con compresas de hielo o de infusión de hamamelis Ladona Shay (\"witch hazel\"). · Alivie el estreñimiento bebiendo abundante líquido y comiendo alimentos ricos en fibra. Pregúntele a newman médico acerca de los ablandadores de heces de [employer]. · Límpiese con un chorrito suave de agua tibia de dominique botella en vez de hacerlo con papel higiénico. 
· Gay un baño de asiento en agua tibia varias veces al día. · Use un buen sostén de lactancia. Alivie el dolor y la hinchazón de los senos con toallitas de aseo húmedas tibias. · Si no está amamantando, utilice hielo en lugar de calor para aliviar el dolor de los senos. · Si está amamantando, newman período menstrual podría no comenzar hasta después de varios meses. Es posible que Antonio, y más tiempo al principio, de gab lo hacía antes del Hyla Tomball. · Espere hasta que haya sanado (de 4 a 6 semanas) antes de volver a tener relaciones sexuales. Newman médico le dirá cuándo puede tener relaciones sexuales. · Trate de no viajar con el bebé lukas las primeras 5 o 6 semanas. Si hace un viaje lynette en automóvil, huang paradas frecuentes para caminar y estirarse. Evite el agotamiento · Descanse todos los días. Trate de dormir la siesta cuando newman bebé también lo hace. · Pídale a otro adulto que la acompañe por unos joselyn después del [place]. · Planifique el cuidado de los niños si tiene otros hijos. · Sea flexible para que pueda comer a horas fuera de lo común y dormir cuando lo necesite. Tanto usted gab newman bebé están creando horarios nuevos. · Planee pequeñas salidas para estar afuera de la casa. El cambio podría hacer que se sienta menos fatigada. · Pida ayuda para cocinar y [address] hogar y las compras. Recuerde que newman principal tarea consiste en cuidar a newman bebé. Conozca la ayuda que puede recibir en aysha de tener depresión posparto · La depresión posparto es común lukas las primeras 1 a 2 semanas siguientes al nacimiento. Podría llorar o sentirse ashely o irritable sin razón alguna. · Descanse cada vez que pueda hacerlo. Estar fatigada le dificulta manejar cara emociones. · Salga a caminar con newman bebé. · Hable con newman danial, cara amigos y cara familiares acerca de cara sentimientos. · Si cara síntomas woo más de unas pocas semanas, o si se siente muy deprimida, pídale ayuda a newman médico. 
· La depresión posparto puede tratarse. Los grupos de apoyo y la asesoría psicológica pueden ser de [facility]. A veces, los medicamentos también pueden ayudar. Manténgase saludable · Coma alimentos sanos para tener más energía, producir dominique buena [place] materna y adelgazar las libras adicionales que engordó con el bebé. · Si amamanta, evite el alcohol y las drogas. No fume. Si dejó de fumar lukas el embarazo, felicitaciones. · Inicie ejercicios diarios después de 4 a 6 semanas, dino descanse cuando se sienta fatigada. · Aprenda ejercicios para tonificar el abdomen. Ezra los ejercicios de Kegel para recuperar la fuerza de los músculos pélvicos. Puede hacer los ejercicios de Kegel mientras está de pie o sentada. ¨ Apriete los mismos músculos que usted usaría para detener la [place]. El abdomen y los muslos no deben moverse. ¨ Manténgalos apretados lukas 3 segundos y, luego, relájelos otros 3 segundos. ¨ Empiece con 3 segundos.  Tammie Eis 1 bethel cada TXU Jenn sea capaz de apretar lukas 10 segundos. ¨ Repita el ejercicio entre 10 y 13 veces cada sesión. Ezra muna o más sesiones cada día. · Busque dominique clase para nuevas madres y recién nacidos que tenga un tiempo de ejercicio. · Si le patterson hecho dominique cesárea, dese un poco más de tiempo antes de hacer ejercicio, y tenga cuidado. Cuándo debe pedir ayuda? Llame al 911 en cualquier momento que considere que necesita atención de [place]. Por ejemplo, llame si: 
· Se desmayó (perdió el conocimiento). Llame a pena médico ahora mismo o busque atención médica inmediata si: · Tiene sangrado vaginal intenso. [place] significa que está expulsando coágulos sanguíneos y empapando dominique toalla sanitaria cada hora por 2 horas o más. · Está mareada o aturdida, o siente gab que se puede [address] [facility] 15. · Tiene fiebre. · Tiene dolor abdominal nuevo o que empeora. Preste especial atención a los cambios en pena logan y asegúrese de comunicarse con pena médico si: 
· Pena sangrado vaginal parece volverse más intenso. · Tiene flujo vaginal nuevo o que empeora. · Se siente ashely, ansiosa o sin esperanzas lukas más de unos pocos días. · No mejora gab se esperaba. Dónde puede encontrar más información en inglés? Emmanuel Mathew a [email or web address]-sravani.info/. Varsha Flores [id number] en la búsqueda para aprender más acerca de \"Después del parto (período de posparto): Instrucciones de cuidado - [ After Your Delivery (the Postpartum Period): Care Instructions ]. \" 
Revisado: 16 marzo, 2017 Versión del contenido: 11.3 © 9355-6968 Healthwise, Incorporated. Las instrucciones de cuidado fueron adaptadas bajo licencia por [employer] (which disclaims liability or warranty for this information). Si usted tiene [place] afección médica o sobre estas instrucciones, siempre pregunte a pena profesional de logan. Healthwise, Incorporated niega toda garantía o responsabilidad por pena uso de esta información. DISCHARGE SUMMARY from Nurse The following personal items are in your possession at time of discharge: 
 
Dental Appliances: None Visual Aid: None Home Medications: None Jewelry: None Clothing: At bedside Other Valuables: [employer], Susie Severance, [address] [place] [address] PATIENT INSTRUCTIONS: 
 
After general anesthesia or intravenous sedation, for 24 hours or while taking prescription Narcotics: · Limit your activities · Do not drive and operate hazardous machinery · Do not make important personal or business decisions · Do  not drink alcoholic beverages · If you have not urinated within 8 hours after discharge, please contact your surgeon on call. Report the following to your surgeon: 
· Excessive pain, swelling, redness or odor of or around the surgical area · Temperature over 100.5 · Nausea and vomiting lasting longer than 4 hours or if unable to take medications · Any signs of decreased circulation or nerve impairment to extremity: change in color, persistent  numbness, tingling, coldness or increase pain · Any questions What to do at Home: 
Recommended activity: 
 
Discharge instruction to follow: Activity: Pelvis rest for 6 weeks No heavy lifting over 15 lbs for 2 weeks No driving for 2 weeks No push/pull motion such as sweeping or vacuuming for 2 weeks No tub baths for 6 weeks  section keep incision clean and dry, may shower as normal with soap and water. Inspect incision every day for signs of infection listed below. Continue to use klaudia-bottle with every void or bowel movement until comfortable stopping. Change sanitary pad after each urination or bowel movement. Call MD for the following: 
    Fever over 101 F; pain not relieved by medication; foul smelling vaginal discharge or increase in vaginal bleeding. Redness, swelling, or drainage from  incision. Take medication as prescribed. Follow up with MD as order. *  Please give a list of your current medications to your Primary Care Provider. *  Please update this list whenever your medications are discontinued, doses are 
    changed, or new medications (including over-the-counter products) are added. *  Please carry medication information at all times in case of emergency situations. These are general instructions for a healthy lifestyle: No smoking/ No tobacco products/ Avoid exposure to second hand smoke Surgeon General's Warning:  Quitting smoking now greatly reduces serious risk to your health. Obesity, smoking, and sedentary lifestyle greatly increases your risk for illness A healthy diet, regular physical exercise & weight monitoring are important for maintaining a healthy lifestyle You may be retaining fluid if you have a history of heart failure or if you experience any of the following symptoms:  Weight gain of 3 pounds or more overnight or 5 pounds in a week, increased swelling in our hands or feet or shortness of breath while lying flat in bed. Please call your doctor as soon as you notice any of these symptoms; do not wait until your next office visit. Recognize signs and symptoms of STROKE: 
 
F-face looks uneven A-arms unable to move or move unevenly S-speech slurred or non-existent T-time-call 911 as soon as signs and symptoms begin-DO NOT go Back to bed or wait to see if you get better-TIME IS BRAIN. Warning Signs of HEART ATTACK Call 911 if you have these symptoms: 
? Chest discomfort. Most heart attacks involve discomfort in the center of the chest that lasts more than a few minutes, or that goes away and comes back. It can feel like uncomfortable pressure, squeezing, fullness, or pain. ? Discomfort in other areas of the upper body. Symptoms can include pain or discomfort in one or both arms, the back, neck, jaw, or stomach. ? Shortness of breath with or without chest discomfort. ? Other signs may include breaking out in a cold sweat, nausea, or lightheadedness. Don't wait more than five minutes to call [address]! Fast action can save your life. Calling 911 is almost always the fastest way to get lifesaving treatment. Emergency Medical Services staff can begin treatment when they arrive  up to an hour sooner than if someone gets to the hospital by car. The discharge information has been reviewed with the patient. The patient verbalized understanding. Discharge medications reviewed with the patient and appropriate educational materials and side effects teaching were provided. Discharge Orders [employer] Announcement We are excited to announce that we are making your provider's discharge notes available to you in [employer]. You will see these notes when they are completed and signed by the physician that discharged you from your recent hospital stay. If you have any questions or concerns about any information you see in [employer], please call the Health Information Department where you were seen or reach out to your Primary Care Provider for more information about your plan of care. Introducing [facility] & HEALTH SERVICES! [facility] introduce portal paciente [employer] . Ahora se puede acceder a partes de newman expediente médico, enviar por correo electrónico la oficina de newman médico y solicitar renovaciones de medicamentos en línea. En newman navegador de Internet , Crys Mouse a [email or web address] Ezra clic en el usuario por Donneta Presser? Corene Bijou clic aquí en la sesión Rivera Nail. Verá la página de registro [place]. Ingrese newman código de [employer] marisol y gab aparece a continuación. Usted no tendrá que [employer] código después de belia completado el proceso de registro . Si usted no se inscribe antes de la fecha de caducidad , debe solicitar un nuevo código. · [employer] Código de acceso : [id number] Expires: 9/5/2017  4:26 PM 
 
Ingresa los últimos cuatro dígitos de newman Número de Seguro Social ( xxxx ) y fecha de nacimiento ( dd / mm / aaaa ) gab se indica y ezra clic en Enviar. Usted será llevado a la siguiente página de registro . Crear un ID MyChart . Esta será newman ID de inicio de sesión de MyChart y no puede ser [place] , por lo que pensar en dominique que es Philomena Olivier y fácil de recordar . Crear dominique contraseña MyChart . Usted puede cambiar newman contraseña en cualquier momento . Ingrese newman Password Reset de preguntas y [place] . [place] se puede utilizar en un momento posterior si usted olvida newman contraseña. Introduzca newman dirección de correo electrónico . Von Remedios recibirá dominique notificación por correo electrónico cuando la nueva información está disponible en MyChart . Norrine Tere clic en Registrarse. Pardo Nurse rama y descargar porciones de newman expediente médico. 
Ezra clic en el enlace de descarga del menú Resumen para descargar dominique copia portátil de newman información médica . Si tiene [employer] , por favor visite la sección de preguntas frecuentes del sitio web MyChart . Recuerde, [employer] NO es que se utilizará para las necesidades urgentes. Para emergencias médicas , llame al 911 . Ahora disponible en newman iPhone y Android ! General Information Please provide this summary of care documentation to your next provider. Patient Signature:  ____________________________________________________________ Date:  ____________________________________________________________  
  
Rexann Ports Provider Signature:  ____________________________________________________________ Date:  ____________________________________________________________

## 2017-08-20 NOTE — IP AVS SNAPSHOT
[place] [address] 
[phone] Patient: Gil Holloway MRN: [id number] DBM:[id number] Current Discharge Medication List  
  
START taking these medications Dose & Instructions Dispensing Information Comments Morning Noon Evening Bedtime HYDROcodone-acetaminophen 7.5-325 mg per tablet Commonly known as:  Jason Stephens Your last dose was: Your next dose is:    
   
   
 Dose:  1 Tab Take 1 Tab by mouth every four (4) hours as needed. Max Daily Amount: 6 Tabs. Quantity:  30 Tab Refills:  0  
     
   
   
   
  
 ibuprofen 800 mg tablet Commonly known as:  MOTRIN Your last dose was: Your next dose is:    
   
   
 Dose:  800 mg Take 1 Tab by mouth every eight (8) hours as needed. Quantity:  30 Tab Refills:  0 CONTINUE these medications which have NOT CHANGED Dose & Instructions Dispensing Information Comments Morning Noon Evening Bedtime COLACE 100 mg capsule Generic drug:  docusate sodium Your last dose was: Your next dose is:    
   
   
 Dose:  100 mg Take 100 mg by mouth daily. Refills:  0  
     
   
   
   
  
 ferrous sulfate 325 mg (65 mg iron) tablet Your last dose was: Your next dose is:    
   
   
 Dose:  325 mg Take 325 mg by mouth daily. Refills:  0 PRENATAL #2 PO Your last dose was: Your next dose is: Take  by mouth. Refills:  0 Where to Get Your Medications These medications were sent to [address] [place] [address], [facility] [address]  [facility] [address] [place] [id number] Phone:  [phone]  
  ibuprofen 800 mg tablet Information on where to get these meds will be given to you by the nurse or doctor. ! Ask your nurse or doctor about these medications HYDROcodone-acetaminophen 7.5-325 mg per tablet

## 2017-08-21 ENCOUNTER — ANESTHESIA (OUTPATIENT)
Dept: LABOR AND DELIVERY | Age: 32
DRG: 540 | End: 2017-08-21
Payer: MEDICAID

## 2017-08-21 ENCOUNTER — ANESTHESIA EVENT (OUTPATIENT)
Dept: LABOR AND DELIVERY | Age: 32
DRG: 540 | End: 2017-08-21
Payer: MEDICAID

## 2017-08-21 PROBLEM — Z37.9 NORMAL LABOR: Status: ACTIVE | Noted: 2017-08-21

## 2017-08-21 LAB
ABO + RH BLD: NORMAL
BASE DEFICIT BLDCOA-SCNC: 0.3 MMOL/L (ref 0–2)
BASE DEFICIT BLDCOV-SCNC: 0.8 MMOL/L (ref 1.9–7.7)
BDY SITE: ABNORMAL
BDY SITE: ABNORMAL
BLOOD GROUP ANTIBODIES SERPL: NORMAL
ERYTHROCYTE [DISTWIDTH] IN BLOOD BY AUTOMATED COUNT: 15.9 % (ref 11.9–14.6)
HCO3 BLDCOA-SCNC: 27 MMOL/L (ref 22–26)
HCO3 BLDV-SCNC: 24 MMOL/L
HCT VFR BLD AUTO: 37.3 % (ref 35.8–46.3)
HGB BLD-MCNC: 12.2 G/DL (ref 11.7–15.4)
MCH RBC QN AUTO: 28.8 PG (ref 26.1–32.9)
MCHC RBC AUTO-ENTMCNC: 32.7 G/DL (ref 31.4–35)
MCV RBC AUTO: 88.2 FL (ref 79.6–97.8)
PCO2 BLDCOA: 54 MMHG (ref 33–49)
PCO2 BLDCOV: 39 MMHG (ref 14.1–43.3)
PH BLDCOA: 7.31 [PH] (ref 7.21–7.31)
PH BLDCOV: 7.4 [PH] (ref 7.2–7.44)
PLATELET # BLD AUTO: 125 K/UL (ref 150–450)
PMV BLD AUTO: ABNORMAL FL (ref 10.8–14.1)
PO2 BLDCOA: 13 MMHG (ref 9–19)
PO2 BLDV: 31 MMHG (ref 30.4–57.2)
RBC # BLD AUTO: 4.23 M/UL (ref 4.05–5.25)
SERVICE CMNT-IMP: ABNORMAL
SERVICE CMNT-IMP: ABNORMAL
SPECIMEN EXP DATE BLD: NORMAL
WBC # BLD AUTO: 12.4 K/UL (ref 4.3–11.1)

## 2017-08-21 PROCEDURE — 74011250637 HC RX REV CODE- 250/637: Performed by: ANESTHESIOLOGY

## 2017-08-21 PROCEDURE — 77030002933 HC SUT MCRYL J&J -A: Performed by: OBSTETRICS & GYNECOLOGY

## 2017-08-21 PROCEDURE — 74011250636 HC RX REV CODE- 250/636

## 2017-08-21 PROCEDURE — 76010000391 HC C SECN FIRST 1 HR: Performed by: OBSTETRICS & GYNECOLOGY

## 2017-08-21 PROCEDURE — 74011250636 HC RX REV CODE- 250/636: Performed by: OBSTETRICS & GYNECOLOGY

## 2017-08-21 PROCEDURE — 77030008462 HC STPLR SKN PROX J&J -A: Performed by: OBSTETRICS & GYNECOLOGY

## 2017-08-21 PROCEDURE — 74011258636 HC RX REV CODE- 258/636: Performed by: OBSTETRICS & GYNECOLOGY

## 2017-08-21 PROCEDURE — 76010000392 HC C SECN EA ADDL 0.5 HR: Performed by: OBSTETRICS & GYNECOLOGY

## 2017-08-21 PROCEDURE — 74011000250 HC RX REV CODE- 250

## 2017-08-21 PROCEDURE — 65270000029 HC RM PRIVATE

## 2017-08-21 PROCEDURE — 74011000250 HC RX REV CODE- 250: Performed by: ANESTHESIOLOGY

## 2017-08-21 PROCEDURE — 86900 BLOOD TYPING SEROLOGIC ABO: CPT | Performed by: OBSTETRICS & GYNECOLOGY

## 2017-08-21 PROCEDURE — 77030018846 HC SOL IRR STRL H20 ICUM -A: Performed by: OBSTETRICS & GYNECOLOGY

## 2017-08-21 PROCEDURE — 82803 BLOOD GASES ANY COMBINATION: CPT

## 2017-08-21 PROCEDURE — 85027 COMPLETE CBC AUTOMATED: CPT | Performed by: OBSTETRICS & GYNECOLOGY

## 2017-08-21 PROCEDURE — 74011250636 HC RX REV CODE- 250/636: Performed by: ANESTHESIOLOGY

## 2017-08-21 PROCEDURE — 77030002888 HC SUT CHRMC J&J -A: Performed by: OBSTETRICS & GYNECOLOGY

## 2017-08-21 PROCEDURE — 77030032490 HC SLV COMPR SCD KNE COVD -B

## 2017-08-21 PROCEDURE — 77030003665 HC NDL SPN BBMI -A: Performed by: ANESTHESIOLOGY

## 2017-08-21 PROCEDURE — 77030007880 HC KT SPN EPDRL BBMI -B: Performed by: ANESTHESIOLOGY

## 2017-08-21 PROCEDURE — 77030005518 HC CATH URETH FOL 2W BARD -B

## 2017-08-21 PROCEDURE — 77030031139 HC SUT VCRL2 J&J -A: Performed by: OBSTETRICS & GYNECOLOGY

## 2017-08-21 PROCEDURE — 77030018836 HC SOL IRR NACL ICUM -A: Performed by: OBSTETRICS & GYNECOLOGY

## 2017-08-21 PROCEDURE — 77030011640 HC PAD GRND REM COVD -A: Performed by: OBSTETRICS & GYNECOLOGY

## 2017-08-21 PROCEDURE — 75410000002 HC LABOR FEE PER 1 HR

## 2017-08-21 PROCEDURE — 75410000003 HC RECOV DEL/VAG/CSECN EA 0.5 HR: Performed by: OBSTETRICS & GYNECOLOGY

## 2017-08-21 PROCEDURE — 76060000078 HC EPIDURAL ANESTHESIA: Performed by: OBSTETRICS & GYNECOLOGY

## 2017-08-21 RX ORDER — ACETAMINOPHEN 500 MG
1000 TABLET ORAL
Status: DISCONTINUED | OUTPATIENT
Start: 2017-08-21 | End: 2017-08-23

## 2017-08-21 RX ORDER — BUTORPHANOL TARTRATE 1 MG/ML
1 INJECTION INTRAMUSCULAR; INTRAVENOUS
Status: DISCONTINUED | OUTPATIENT
Start: 2017-08-21 | End: 2017-08-21 | Stop reason: HOSPADM

## 2017-08-21 RX ORDER — NALOXONE HYDROCHLORIDE 0.4 MG/ML
0.2 INJECTION, SOLUTION INTRAMUSCULAR; INTRAVENOUS; SUBCUTANEOUS
Status: ACTIVE | OUTPATIENT
Start: 2017-08-21 | End: 2017-08-22

## 2017-08-21 RX ORDER — CEFAZOLIN SODIUM IN 0.9 % NACL 2 G/50 ML
2 INTRAVENOUS SOLUTION, PIGGYBACK (ML) INTRAVENOUS
Status: COMPLETED | OUTPATIENT
Start: 2017-08-21 | End: 2017-08-21

## 2017-08-21 RX ORDER — ONDANSETRON 2 MG/ML
INJECTION INTRAMUSCULAR; INTRAVENOUS AS NEEDED
Status: DISCONTINUED | OUTPATIENT
Start: 2017-08-21 | End: 2017-08-21 | Stop reason: HOSPADM

## 2017-08-21 RX ORDER — LIDOCAINE HYDROCHLORIDE 20 MG/ML
JELLY TOPICAL
Status: DISCONTINUED | OUTPATIENT
Start: 2017-08-21 | End: 2017-08-21 | Stop reason: HOSPADM

## 2017-08-21 RX ORDER — SODIUM CHLORIDE, SODIUM LACTATE, POTASSIUM CHLORIDE, CALCIUM CHLORIDE 600; 310; 30; 20 MG/100ML; MG/100ML; MG/100ML; MG/100ML
150 INJECTION, SOLUTION INTRAVENOUS CONTINUOUS
Status: DISCONTINUED | OUTPATIENT
Start: 2017-08-21 | End: 2017-08-21 | Stop reason: HOSPADM

## 2017-08-21 RX ORDER — ONDANSETRON 2 MG/ML
4 INJECTION INTRAMUSCULAR; INTRAVENOUS
Status: ACTIVE | OUTPATIENT
Start: 2017-08-21 | End: 2017-08-22

## 2017-08-21 RX ORDER — SODIUM CHLORIDE 9 MG/ML
50 INJECTION, SOLUTION INTRAVENOUS CONTINUOUS
Status: DISCONTINUED | OUTPATIENT
Start: 2017-08-21 | End: 2017-08-21

## 2017-08-21 RX ORDER — BUPIVACAINE HYDROCHLORIDE 7.5 MG/ML
INJECTION, SOLUTION INTRASPINAL AS NEEDED
Status: DISCONTINUED | OUTPATIENT
Start: 2017-08-21 | End: 2017-08-21 | Stop reason: HOSPADM

## 2017-08-21 RX ORDER — SODIUM CHLORIDE 0.9 % (FLUSH) 0.9 %
5-10 SYRINGE (ML) INJECTION AS NEEDED
Status: DISCONTINUED | OUTPATIENT
Start: 2017-08-21 | End: 2017-08-21

## 2017-08-21 RX ORDER — DOCUSATE SODIUM 100 MG/1
100 CAPSULE, LIQUID FILLED ORAL 2 TIMES DAILY
Status: DISCONTINUED | OUTPATIENT
Start: 2017-08-21 | End: 2017-08-24 | Stop reason: HOSPADM

## 2017-08-21 RX ORDER — MINERAL OIL
120 OIL (ML) ORAL
Status: DISCONTINUED | OUTPATIENT
Start: 2017-08-21 | End: 2017-08-21 | Stop reason: HOSPADM

## 2017-08-21 RX ORDER — SODIUM CHLORIDE 0.9 % (FLUSH) 0.9 %
5-10 SYRINGE (ML) INJECTION EVERY 8 HOURS
Status: DISCONTINUED | OUTPATIENT
Start: 2017-08-21 | End: 2017-08-23

## 2017-08-21 RX ORDER — SODIUM CHLORIDE, SODIUM LACTATE, POTASSIUM CHLORIDE, CALCIUM CHLORIDE 600; 310; 30; 20 MG/100ML; MG/100ML; MG/100ML; MG/100ML
150 INJECTION, SOLUTION INTRAVENOUS CONTINUOUS
Status: DISCONTINUED | OUTPATIENT
Start: 2017-08-21 | End: 2017-08-21

## 2017-08-21 RX ORDER — LABETALOL HYDROCHLORIDE 5 MG/ML
INJECTION, SOLUTION INTRAVENOUS AS NEEDED
Status: DISCONTINUED | OUTPATIENT
Start: 2017-08-21 | End: 2017-08-21 | Stop reason: HOSPADM

## 2017-08-21 RX ORDER — NALBUPHINE HYDROCHLORIDE 10 MG/ML
5 INJECTION, SOLUTION INTRAMUSCULAR; INTRAVENOUS; SUBCUTANEOUS
Status: ACTIVE | OUTPATIENT
Start: 2017-08-21 | End: 2017-08-22

## 2017-08-21 RX ORDER — OXYTOCIN/RINGER'S LACTATE 30/500 ML
PLASTIC BAG, INJECTION (ML) INTRAVENOUS
Status: DISCONTINUED | OUTPATIENT
Start: 2017-08-21 | End: 2017-08-21 | Stop reason: HOSPADM

## 2017-08-21 RX ORDER — TRISODIUM CITRATE DIHYDRATE AND CITRIC ACID MONOHYDRATE 500; 334 MG/5ML; MG/5ML
30 SOLUTION ORAL ONCE
Status: COMPLETED | OUTPATIENT
Start: 2017-08-21 | End: 2017-08-21

## 2017-08-21 RX ORDER — SODIUM CHLORIDE 0.9 % (FLUSH) 0.9 %
5-10 SYRINGE (ML) INJECTION EVERY 8 HOURS
Status: DISCONTINUED | OUTPATIENT
Start: 2017-08-21 | End: 2017-08-21

## 2017-08-21 RX ORDER — MORPHINE SULFATE 0.5 MG/ML
INJECTION, SOLUTION EPIDURAL; INTRATHECAL; INTRAVENOUS AS NEEDED
Status: DISCONTINUED | OUTPATIENT
Start: 2017-08-21 | End: 2017-08-21 | Stop reason: HOSPADM

## 2017-08-21 RX ORDER — ONDANSETRON 2 MG/ML
4 INJECTION INTRAMUSCULAR; INTRAVENOUS
Status: DISCONTINUED | OUTPATIENT
Start: 2017-08-21 | End: 2017-08-21 | Stop reason: HOSPADM

## 2017-08-21 RX ORDER — CEFAZOLIN SODIUM IN 0.9 % NACL 2 G/50 ML
INTRAVENOUS SOLUTION, PIGGYBACK (ML) INTRAVENOUS AS NEEDED
Status: DISCONTINUED | OUTPATIENT
Start: 2017-08-21 | End: 2017-08-21 | Stop reason: HOSPADM

## 2017-08-21 RX ORDER — OXYCODONE HYDROCHLORIDE 5 MG/1
10 TABLET ORAL
Status: DISPENSED | OUTPATIENT
Start: 2017-08-21 | End: 2017-08-22

## 2017-08-21 RX ORDER — OXYTOCIN/RINGER'S LACTATE 30/500 ML
PLASTIC BAG, INJECTION (ML) INTRAVENOUS
Status: COMPLETED
Start: 2017-08-21 | End: 2017-08-21

## 2017-08-21 RX ORDER — KETOROLAC TROMETHAMINE 30 MG/ML
30 INJECTION, SOLUTION INTRAMUSCULAR; INTRAVENOUS
Status: DISPENSED | OUTPATIENT
Start: 2017-08-21 | End: 2017-08-22

## 2017-08-21 RX ORDER — SODIUM CHLORIDE 0.9 % (FLUSH) 0.9 %
5-10 SYRINGE (ML) INJECTION AS NEEDED
Status: DISCONTINUED | OUTPATIENT
Start: 2017-08-21 | End: 2017-08-24 | Stop reason: HOSPADM

## 2017-08-21 RX ORDER — METHYLERGONOVINE MALEATE 0.2 MG/ML
INJECTION INTRAVENOUS AS NEEDED
Status: DISCONTINUED | OUTPATIENT
Start: 2017-08-21 | End: 2017-08-21 | Stop reason: HOSPADM

## 2017-08-21 RX ORDER — LIDOCAINE HYDROCHLORIDE 10 MG/ML
1 INJECTION INFILTRATION; PERINEURAL
Status: DISCONTINUED | OUTPATIENT
Start: 2017-08-21 | End: 2017-08-21 | Stop reason: HOSPADM

## 2017-08-21 RX ORDER — DEXTROSE, SODIUM CHLORIDE, SODIUM LACTATE, POTASSIUM CHLORIDE, AND CALCIUM CHLORIDE 5; .6; .31; .03; .02 G/100ML; G/100ML; G/100ML; G/100ML; G/100ML
125 INJECTION, SOLUTION INTRAVENOUS CONTINUOUS
Status: DISCONTINUED | OUTPATIENT
Start: 2017-08-21 | End: 2017-08-21

## 2017-08-21 RX ORDER — SODIUM CHLORIDE, SODIUM LACTATE, POTASSIUM CHLORIDE, CALCIUM CHLORIDE 600; 310; 30; 20 MG/100ML; MG/100ML; MG/100ML; MG/100ML
125 INJECTION, SOLUTION INTRAVENOUS CONTINUOUS
Status: DISCONTINUED | OUTPATIENT
Start: 2017-08-21 | End: 2017-08-23

## 2017-08-21 RX ORDER — HYDROMORPHONE HYDROCHLORIDE 1 MG/ML
1 INJECTION, SOLUTION INTRAMUSCULAR; INTRAVENOUS; SUBCUTANEOUS
Status: ACTIVE | OUTPATIENT
Start: 2017-08-21 | End: 2017-08-22

## 2017-08-21 RX ORDER — OXYTOCIN/RINGER'S LACTATE 30/500 ML
.5-2 PLASTIC BAG, INJECTION (ML) INTRAVENOUS
Status: DISCONTINUED | OUTPATIENT
Start: 2017-08-21 | End: 2017-08-22

## 2017-08-21 RX ORDER — OXYTOCIN/RINGER'S LACTATE 15/250 ML
250 PLASTIC BAG, INJECTION (ML) INTRAVENOUS ONCE
Status: ACTIVE | OUTPATIENT
Start: 2017-08-21 | End: 2017-08-21

## 2017-08-21 RX ORDER — SIMETHICONE 80 MG
80 TABLET,CHEWABLE ORAL
Status: DISCONTINUED | OUTPATIENT
Start: 2017-08-21 | End: 2017-08-24 | Stop reason: HOSPADM

## 2017-08-21 RX ADMIN — SODIUM CHLORIDE, SODIUM LACTATE, POTASSIUM CHLORIDE, CALCIUM CHLORIDE, AND DEXTROSE MONOHYDRATE 125 ML/HR: 600; 310; 30; 20; 5 INJECTION, SOLUTION INTRAVENOUS at 00:47

## 2017-08-21 RX ADMIN — Medication 2 G: at 09:22

## 2017-08-21 RX ADMIN — BUPIVACAINE HYDROCHLORIDE 1.6 ML: 7.5 INJECTION, SOLUTION INTRASPINAL at 09:43

## 2017-08-21 RX ADMIN — Medication 500 ML/HR: at 10:04

## 2017-08-21 RX ADMIN — CEFAZOLIN 2 G: 1 INJECTION, POWDER, FOR SOLUTION INTRAMUSCULAR; INTRAVENOUS; PARENTERAL at 09:21

## 2017-08-21 RX ADMIN — SODIUM CHLORIDE, SODIUM LACTATE, POTASSIUM CHLORIDE, AND CALCIUM CHLORIDE 125 ML/HR: 600; 310; 30; 20 INJECTION, SOLUTION INTRAVENOUS at 12:25

## 2017-08-21 RX ADMIN — Medication 2 MILLI-UNITS/MIN: at 08:15

## 2017-08-21 RX ADMIN — FAMOTIDINE 20 MG: 10 INJECTION, SOLUTION INTRAVENOUS at 08:50

## 2017-08-21 RX ADMIN — ONDANSETRON 4 MG: 2 INJECTION INTRAMUSCULAR; INTRAVENOUS at 10:04

## 2017-08-21 RX ADMIN — OXYTOCIN 2 MILLI-UNITS/MIN: 10 INJECTION, SOLUTION INTRAMUSCULAR; INTRAVENOUS at 08:15

## 2017-08-21 RX ADMIN — OXYCODONE HYDROCHLORIDE 10 MG: 5 TABLET ORAL at 18:30

## 2017-08-21 RX ADMIN — MORPHINE SULFATE 250 MCG: 0.5 INJECTION, SOLUTION EPIDURAL; INTRATHECAL; INTRAVENOUS at 09:43

## 2017-08-21 RX ADMIN — KETOROLAC TROMETHAMINE 30 MG: 30 INJECTION, SOLUTION INTRAMUSCULAR at 13:21

## 2017-08-21 RX ADMIN — LABETALOL HYDROCHLORIDE 5 MG: 5 INJECTION, SOLUTION INTRAVENOUS at 10:05

## 2017-08-21 RX ADMIN — Medication 10 ML: at 22:11

## 2017-08-21 RX ADMIN — SODIUM CHLORIDE, SODIUM LACTATE, POTASSIUM CHLORIDE, AND CALCIUM CHLORIDE 500 ML: 600; 310; 30; 20 INJECTION, SOLUTION INTRAVENOUS at 00:47

## 2017-08-21 RX ADMIN — OXYCODONE HYDROCHLORIDE 10 MG: 5 TABLET ORAL at 12:05

## 2017-08-21 RX ADMIN — METHYLERGONOVINE MALEATE 0.2 MG: 0.2 INJECTION INTRAVENOUS at 10:00

## 2017-08-21 RX ADMIN — SODIUM CITRATE AND CITRIC ACID MONOHYDRATE 30 ML: 500; 334 SOLUTION ORAL at 08:50

## 2017-08-21 RX ADMIN — SODIUM CHLORIDE, SODIUM LACTATE, POTASSIUM CHLORIDE, AND CALCIUM CHLORIDE: 600; 310; 30; 20 INJECTION, SOLUTION INTRAVENOUS at 09:22

## 2017-08-21 RX ADMIN — SODIUM CHLORIDE, SODIUM LACTATE, POTASSIUM CHLORIDE, AND CALCIUM CHLORIDE 125 ML/HR: 600; 310; 30; 20 INJECTION, SOLUTION INTRAVENOUS at 19:31

## 2017-08-21 NOTE — ANESTHESIA PREPROCEDURE EVALUATION
Anesthetic History   No history of anesthetic complications            Review of Systems / Medical History  Patient summary reviewed, nursing notes reviewed and pertinent labs reviewed    Pulmonary  Within defined limits                 Neuro/Psych   Within defined limits           Cardiovascular                  Exercise tolerance: >4 METS     GI/Hepatic/Renal     GERD: well controlled           Endo/Other        Obesity     Other Findings   Comments: Term preg., induction, unreasuring strip., for emerg. Cs.           Physical Exam    Airway  Mallampati: III  TM Distance: 4 - 6 cm  Neck ROM: normal range of motion   Mouth opening: Normal     Cardiovascular    Rhythm: regular           Dental  No notable dental hx       Pulmonary                 Abdominal         Other Findings            Anesthetic Plan    ASA: 2, emergent  Anesthesia type: spinal      Post-op pain plan if not by surgeon: intrathecal opiates      Anesthetic plan and risks discussed with: Patient and Spouse      Disc. Through .

## 2017-08-21 NOTE — PROGRESS NOTES
SBAR IN Report: Mother    Verbal report received from Baron Hinds RN on this patient, who is now being transferred from L & D for routine progression of care. The patient is wearing a green \"Anesthesia-Duramorph\" band. Report consisted of patient's Situation, Background, Assessment and Recommendations (SBAR). [place] ID bands were compared with the identification form, and verified with the patient and transferring nurse. Information from the SBAR, Kardex, OR Summary, Procedure Summary, Intake/Output, MAR, Accordion, Recent Results and Med Rec Status and the [place] Report was reviewed with the transferring nurse; opportunity for questions and clarification provided.

## 2017-08-21 NOTE — PROGRESS NOTES
Variable deceleration noted with FHTs down to 70s  0341 Pt to right side, toco and ultrasound adjusted frequently, LRFB started

## 2017-08-21 NOTE — ANESTHESIA PROCEDURE NOTES
Spinal Block    Performed by: Phyllis Delacruz  Authorized by: Phyllis Delacruz     Pre-procedure:   Indications: primary anesthetic  Preanesthetic Checklist: patient identified, risks and benefits discussed, anesthesia consent, patient being monitored and timeout performed    Timeout Time: 09:33          Spinal Block:   Patient Position:  Seated  Prep Region:  Lumbar  Prep: chlorhexidine      Location:  L3-4  Technique:  Single shot  Local:  Lidocaine 1%  Local Dose (mL):  3    Needle:   Needle Type:  Pencan  Needle Gauge:  25 G  Attempts:  3      Events: CSF confirmed, no blood with aspiration and no paresthesia        Assessment:  Insertion:  Uncomplicated  Patient tolerance:  Patient tolerated the procedure well with no immediate complications  All needles out intact, procedure tolerated well without problems

## 2017-08-21 NOTE — PROGRESS NOTES
Dr Tushar Velasquez spoke with Dr Tushar Blanton regarding pt. Admitting orders received. Will transfer pt to 428 for admission.

## 2017-08-21 NOTE — OP NOTES
Section Delivery Operative Report     Date of Surgery: 2017     Preoperative Diagnosis: Post term pregnancy, non reassuring fetal heart tones    Postoperative Diagnosis: same with viable female apgars 9/9 3295g    Procedure: Procedure(s):   SECTION    Surgeon(s) and Role:     * Yordy Correia MD - Primary     Anesthesia: Spinal    Procedure Detail:    The patient was taken to the operating room, where spinal anesthesia was found to be adequate. The patient was prepped and draped in the normal sterile fashion. Pfannenstiel skin incision was made with the scalpel and carried down to the underlying fascia. The fascial incision was extended laterally with Jenkins scissors. This fascial incision was grasped with Kocher clamps, tented up, and the underlying rectus muscles were dissected bluntly. The inferior edge of the rectus fascia was grasped with Kocher clamps, tented up, and the underlying rectus muscle was dissected off bluntly. The rectus muscle was divided in the midline bluntly. The peritoneum was entered bluntly with hemostat and extended inferiorly and superiorly with Metzenbaum scissors. The bladder blade was then inserted. The vesicouterine and peritoneum was identified, grasped with pick-ups and entered sharply with Metzenbaum scissors. The bladder flap was then created digitally and the bladder blade was reinserted. A low transverse uterine incision was made with the scalpel and extended laterally with blunt finger dissection. The babys head was then delivered atraumatically. The nose and mouth were suctioned. The cord was clamped and cut and the baby was handed off to the waiting  care unit staff. Placenta was then delivered spontaneously. The uterus was exteriorized and cleared of all clots and debris. There was bleeding noted in placenta bed which was repaired with locking stitches of 3-0 chromic.  The uterine incision was closed in one layer with 0 Vicryl with good hemostasis assured. The pelvis was washed with warm normal saline. Good hemostasis was again reassured. The paracolic gutters were washed with warm normal saline and the uterus was returned to the abdomen. Good hemostasis was again reassured throughout. the peritoneum was closed with 2-0 chromic in a running fashion. The fascia was closed with 0 Vicryl in a running fashion. Good hemostasis was assured. The skin was closed with staples. The patient tolerated the procedure well. Sponge, lap, and needle counts were correct times two and the patient was taken to recovery in stable condition. Estimated Blood Loss:  800 mls    Specimens:   ID Type Source Tests Collected by Time Destination   1 :  Placenta   Krsytle Andrews MD 2017 1023 Discarded        Cord Blood Results:  Information for the patient's :  Camden Reyes [[id number]]     Lab Results   Component Value Date/Time    REBECCA IgG NEG 2017 10:03 AM    ABO/Rh(D) Rometta Anthony POSITIVE 2017 10:03 AM     Lab Results   Component Value Date/Time    PH,CORD BLD ARTERIAL 7.313 2017 10:03 AM    PCO2,CORD BLD ARTERIAL 54 2017 10:03 AM    PO2,CORD BLD ARTERIAL 13 2017 10:03 AM    HCO3,CORD BLD ARTERIAL 27 2017 10:03 AM    BASE DEFICIT,CBA 0.3 2017 10:03 AM    SITE CORD 2017 10:03 AM    SITE CORD 2017 10:03 AM    Respiratory comment: N A at 2017 10 17 06 AM. Not read back. 2017 10:03 AM    Respiratory comment: N A at 2017 10 17 33 AM. Not read back.  2017 10:03 AM        Prenatal Labs:  Lab Results   Component Value Date/Time    ABO/Rh(D) Rometta Anthony POSITIVE 2017 12:42 AM        Signed By:  Krystle Andrews MD     2017

## 2017-08-21 NOTE — ANESTHESIA POSTPROCEDURE EVALUATION
Post-Anesthesia Evaluation and Assessment    Patient: Matt Bentley MRN: [id number]  SSN: [id number]    YOB: 1985  Age: 29 y.o. Sex: female       Cardiovascular Function/Vital Signs  Visit Vitals    /53 (BP 1 Location: Left arm, BP Patient Position: At rest)    Pulse 69    Temp 36.4 °C (97.5 °F)    Resp 16    Ht 5' 2\" (1.575 m)    Wt 68 kg (150 lb)    SpO2 99%    Breastfeeding Yes    BMI 27.44 kg/m2       Patient is status post spinal anesthesia for Procedure(s):   SECTION. Nausea/Vomiting: None    Postoperative hydration reviewed and adequate. Pain:  Pain Scale 1: Numeric (0 - 10) (17 1530)  Pain Intensity 1: 2 (17 1530)   Managed    Neurological Status:   Neuro (WDL): Within Defined Limits (17 1200)  Neuro  Neurologic State: Alert (17 1100)  Orientation Level: Oriented X4 (17 1100)  Speech: Clear (17 1100)  LUE Motor Response: Purposeful (17 1200)  LLE Motor Response: Pharmacologically paralyzed; Purposeful (17 1320)  RUE Motor Response: Purposeful (17 1200)  RLE Motor Response: Pharmacologically paralyzed; Purposeful (17 1320)   At baseline    Mental Status and Level of Consciousness: Arousable    Pulmonary Status:   O2 Device: Room air (17 1320)   Adequate oxygenation and airway patent    Complications related to anesthesia: None    Post-anesthesia assessment completed. No concerns. Good result with spinal anesthesia, resolving normally.     Signed By: Jarett Santiago MD     2017

## 2017-08-21 NOTE — PROGRESS NOTES
Dr. Salma Lo updated on patient status and notified of FHTs with moderate variability and accelerations, irregular variable decelerations, LR fluid bolus given, maternal position change. Decels  and parts of FHR tracing reviewed by MD, no new orders received, continue with observation.

## 2017-08-21 NOTE — PROGRESS NOTES
Patient seen. Reviewed strip - periods of variable decelerations in between periods of reactive strip  Last cervical exam - 3 cm. Patient is remote from delivery and is a primigravida. Had detailed discussion with patient and significant other. Options of doing a primary C/s now vs artificial rupture of membranes to aim for vaginal delivery discussed. Possibility of emergency developing requiring emergency C/s discussed. Patient wants to do primary C/s now. Risks of surgery discussed with patient in the usual detailed fashion. She verbalized understanding and wants to proceed.

## 2017-08-21 NOTE — PROGRESS NOTES
Dr Shahida Cho at bedside , [facility] reviewed. Plan of care discussed with pt via  with verbalized understanding. Decision made to perform a C/Section.  Risks vs benefits explained per Dr Shahida Cho with verbalized understanding

## 2017-08-21 NOTE — PROGRESS NOTES
08/21/17 1322   Pain Assessment   Pain Scale 1 Numeric (0 - 10)   Pain Intensity 1 5   Pain Location 1 Incisional   Pain Orientation 1 Lower   Pain Description 1 Sore   Pain Intervention(s) 1 Medication (see MAR)

## 2017-08-21 NOTE — H&P
History & Physical    Name: Jeremie Green MRN: [id number]  SSN: [id number]    YOB: 1985  Age: 29 y.o. Sex: female        Subjective: Pt presents in early labor with non-reassuring FHR tracing. Estimated Date of Delivery: 17  OB History    Para Term  AB Living   1 0 0 0 0    SAB TAB Ectopic Molar Multiple Live Births   0 0 0 0        # Outcome Date GA Lbr Will/2nd Weight Sex Delivery Anes PTL Lv   1 Current                   Ms. Sury Mitchell is seen with pregnancy at 40w3d for early labor. Prenatal course was normal.  the patients states that the baby moves as usual   Please see prenatal records for details. Past Medical History:   Diagnosis Date    Anemia     has not been taking her iron     History reviewed. No pertinent surgical history. Social History     Occupational History    Not on file. Social History Main Topics    Smoking status: Never Smoker    Smokeless tobacco: Never Used    Alcohol use No    Drug use: No    Sexual activity: Yes     Partners: Male     Family History   Problem Relation Age of Onset    Family history unknown: Yes       No Known Allergies  Prior to Admission medications    Medication Sig Start Date End Date Taking? Authorizing Provider   ferrous sulfate 325 mg (65 mg iron) tablet Take 325 mg by mouth daily. Yes Historical Provider   PRENATAL VIT CALC,IRON,FOLIC (PRENATAL #2 PO) Take  by mouth. Yes Historical Provider   docusate sodium (COLACE) 100 mg capsule Take 100 mg by mouth daily.     Historical Provider        Review of Systems:  Constitutional:No headache, fever  Cardiac:   No chest pain      Resp: No cough or shortness of breath     GI:   No nausea/vomiting, diarrhea, abdominal pain    :   No dysuria  Neuro:     No vision changes, headache      Objective:     Vitals:  Vitals:    17 2316 17 2317   BP:  134/83   Resp:  18   Temp:  98.5 °F (36.9 °C)   Weight: 68 kg (150 lb)    Height: 5' 2\" (1.575 m) Physical Exam:  Patient without distress. Heart: Regular rate and rhythm  Lung: clear to auscultation throughout lung fields, no wheezes, no rales, no rhonchi and normal respiratory effort  Back: costovertebral angle tenderness absent  Abdomen: soft, nontender  Fundus: non tender  Cervical Exam: 1cm/90%/vtx/-2  Lower Extremities:  - Clonus: absent  No evidence of DVT  Membranes:  Intact  Fetal Heart Rate: Baseline: 140 per minute  Variability: moderate  Accelerations: yes  Decelerations: variable  Uterine contractions: regular, every 3-5 minutes    Prenatal Labs:   No results found for: RUBELLAEXT, GRBSEXT, HBSAGEXT, HIVEXT, RPREXT, GONNOEXT, CHLAMEXT      Assessment/Plan:     Ms. Marguerite Saucedo is a  seen with pregnancy at 40w3d for early labor and recurrent variable decels. GBS is negative    Plan:     Admit for labor management; close observation with the recurrent variable decelerations. Patient discussed with Dr. Naty Reed.

## 2017-08-21 NOTE — PROGRESS NOTES
08/21/17 8250   Pain Assessment   Pain Scale 1 Visual   Pain Intensity 1 8   Pain Location 1 Incisional   Pain Orientation 1 Lower   Pain Description 1 Sore   Pain Intervention(s) 1 Medication (see MAR)

## 2017-08-21 NOTE — PROGRESS NOTES
Pt to triage room OG 02 with c/o contractions every 7 minutes. Pt denies any LOF or VB. EFM/[place] applied. Triage database and assessment completed. SVE deferred. Triage process explained to pt. Pt instructed on call light and placed within reach. Verbalized understanding to all teaching. Pt states she feels good fetal movement.   This RN will notify Dr Ashly Maravilla of pt arrival, gest age, contraction pattern, hx etc.

## 2017-08-21 NOTE — PROGRESS NOTES
present at bedside during assessment with Dr. Isac Salazar and unit nurse.     [address] [facility]  [phone]

## 2017-08-21 NOTE — PROGRESS NOTES
Pt admitted to room 428 for labor. Oriented to room and bed. EFM/TOCO tested and applied.  continues to be at bedside. SBAR report received from Zaina Wu RN. Pt care assumed.

## 2017-08-21 NOTE — PROGRESS NOTES
08/21/17 1200   Pain 1   Pain Scale 1 Numeric (0 - 10)   Pain Intensity 1 5   Patient Stated Pain Goal 4   Pain Location 1 Abdomen   Pain Orientation 1 Anterior;Right   Pain Description 1 Aching   Pain Intervention(s) 1 Emotional support;Medication (see MAR)   Oxycodone 10 mg PO given for pain

## 2017-08-21 NOTE — PROGRESS NOTES
Admission assessment completed with Angelo Barone, [place] Interpretor. Also discussed with the patient and her significant other expectations while here at the hospital with the  and answered their questions with the  present.

## 2017-08-21 NOTE — PROGRESS NOTES
SBAR OUT Report: Mother    Verbal report given to Fran Stratton RN   on this patient, who is now being transferred to [facility]  for routine post - op. The patient is wearing a green \"Anesthesia-Duramorph\" band. Report consisted of patient's Situation, Background, Assessment and Recommendations (SBAR). [place] ID bands were compared with the identification form, and verified with the patient and receiving nurse. Information from the SBAR, Kardex, OR Summary, Procedure Summary, Intake/Output, MAR, Accordion, Recent Results and Med Rec Status and the [place] Report was reviewed with the receiving nurse; opportunity for questions and clarification provided.

## 2017-08-22 LAB
BASOPHILS # BLD: 0 K/UL (ref 0–0.2)
BASOPHILS NFR BLD: 0 % (ref 0–2)
DIFFERENTIAL METHOD BLD: ABNORMAL
EOSINOPHIL # BLD: 0 K/UL (ref 0–0.8)
EOSINOPHIL NFR BLD: 0 % (ref 0.5–7.8)
ERYTHROCYTE [DISTWIDTH] IN BLOOD BY AUTOMATED COUNT: 16.2 % (ref 11.9–14.6)
HCT VFR BLD AUTO: 25.5 % (ref 35.8–46.3)
HGB BLD-MCNC: 8.4 G/DL (ref 11.7–15.4)
IMM GRANULOCYTES # BLD: 0.1 K/UL (ref 0–0.5)
IMM GRANULOCYTES NFR BLD: 0.8 % (ref 0–5)
LYMPHOCYTES # BLD: 1.2 K/UL (ref 0.5–4.6)
LYMPHOCYTES NFR BLD: 11 % (ref 13–44)
MCH RBC QN AUTO: 29.4 PG (ref 26.1–32.9)
MCHC RBC AUTO-ENTMCNC: 32.9 G/DL (ref 31.4–35)
MCV RBC AUTO: 89.2 FL (ref 79.6–97.8)
MONOCYTES # BLD: 0.7 K/UL (ref 0.1–1.3)
MONOCYTES NFR BLD: 7 % (ref 4–12)
NEUTS SEG # BLD: 8.5 K/UL (ref 1.7–8.2)
NEUTS SEG NFR BLD: 81 % (ref 43–78)
PLATELET # BLD AUTO: 97 K/UL (ref 150–450)
PMV BLD AUTO: 12.1 FL (ref 10.8–14.1)
RBC # BLD AUTO: 2.86 M/UL (ref 4.05–5.25)
WBC # BLD AUTO: 10.5 K/UL (ref 4.3–11.1)

## 2017-08-22 PROCEDURE — 85025 COMPLETE CBC W/AUTO DIFF WBC: CPT | Performed by: OBSTETRICS & GYNECOLOGY

## 2017-08-22 PROCEDURE — 36415 COLL VENOUS BLD VENIPUNCTURE: CPT | Performed by: OBSTETRICS & GYNECOLOGY

## 2017-08-22 PROCEDURE — 74011250637 HC RX REV CODE- 250/637: Performed by: OBSTETRICS & GYNECOLOGY

## 2017-08-22 PROCEDURE — 65270000029 HC RM PRIVATE

## 2017-08-22 PROCEDURE — 74011250636 HC RX REV CODE- 250/636: Performed by: ANESTHESIOLOGY

## 2017-08-22 RX ORDER — DIPHENHYDRAMINE HCL 25 MG
25 CAPSULE ORAL
Status: DISCONTINUED | OUTPATIENT
Start: 2017-08-22 | End: 2017-08-24 | Stop reason: HOSPADM

## 2017-08-22 RX ORDER — IBUPROFEN 800 MG/1
800 TABLET ORAL
Status: DISCONTINUED | OUTPATIENT
Start: 2017-08-22 | End: 2017-08-24 | Stop reason: HOSPADM

## 2017-08-22 RX ORDER — HYDROCODONE BITARTRATE AND ACETAMINOPHEN 7.5; 325 MG/1; MG/1
1 TABLET ORAL
Status: DISCONTINUED | OUTPATIENT
Start: 2017-08-22 | End: 2017-08-24 | Stop reason: HOSPADM

## 2017-08-22 RX ORDER — PROMETHAZINE HYDROCHLORIDE 25 MG/1
25 TABLET ORAL
Status: DISCONTINUED | OUTPATIENT
Start: 2017-08-22 | End: 2017-08-24 | Stop reason: HOSPADM

## 2017-08-22 RX ADMIN — SIMETHICONE 80 MG: 80 TABLET, CHEWABLE ORAL at 16:21

## 2017-08-22 RX ADMIN — SIMETHICONE 80 MG: 80 TABLET, CHEWABLE ORAL at 12:17

## 2017-08-22 RX ADMIN — IBUPROFEN 800 MG: 800 TABLET ORAL at 16:21

## 2017-08-22 RX ADMIN — HYDROCODONE BITARTRATE AND ACETAMINOPHEN 1 TABLET: 7.5; 325 TABLET ORAL at 16:21

## 2017-08-22 RX ADMIN — HYDROCODONE BITARTRATE AND ACETAMINOPHEN 1 TABLET: 7.5; 325 TABLET ORAL at 12:16

## 2017-08-22 RX ADMIN — DOCUSATE SODIUM 100 MG: 100 CAPSULE, LIQUID FILLED ORAL at 20:30

## 2017-08-22 RX ADMIN — KETOROLAC TROMETHAMINE 30 MG: 30 INJECTION, SOLUTION INTRAMUSCULAR at 00:56

## 2017-08-22 RX ADMIN — DOCUSATE SODIUM 100 MG: 100 CAPSULE, LIQUID FILLED ORAL at 12:17

## 2017-08-22 RX ADMIN — HYDROCODONE BITARTRATE AND ACETAMINOPHEN 1 TABLET: 7.5; 325 TABLET ORAL at 20:30

## 2017-08-22 NOTE — PROGRESS NOTES
Report of care received from, [facility] RN. Bedside report given, pt denies further needs at present time.

## 2017-08-22 NOTE — PROGRESS NOTES
Report of care received from, Natalia Luciano RN.  Bedside report given, pt denies further needs at present time

## 2017-08-22 NOTE — PROGRESS NOTES
Post-Operative Day Number 1 Progress Note    Patient doing well post-op day 1 from  delivery without significant complaints. Pain controlled on current medication. Voiding without difficulty, normal lochia. Vitals:  Patient Vitals for the past 8 hrs:   BP Temp Pulse Resp   17 0757 101/43 98.1 °F (36.7 °C) 85 18     Temp (24hrs), Av.2 °F (36.8 °C), Min:97.5 °F (36.4 °C), Max:98.9 °F (37.2 °C)      Vital signs stable, afebrile. Exam:  Patient without distress. Abdomen soft, fundus firm below level of umbilicus, non tender. Incision dry and clean without erythema. Lower extremities are negative for swelling, cords or tenderness. Lab/Data Review:  Recent Results (from the past 24 hour(s))   CBC WITH AUTOMATED DIFF    Collection Time: 17  8:25 AM   Result Value Ref Range    WBC 10.5 4.3 - 11.1 K/uL    RBC 2.86 (L) 4.05 - 5.25 M/uL    HGB 8.4 (L) 11.7 - 15.4 g/dL    HCT 25.5 (L) 35.8 - 46.3 %    MCV 89.2 79.6 - 97.8 FL    MCH 29.4 26.1 - 32.9 PG    MCHC 32.9 31.4 - 35.0 g/dL    RDW 16.2 (H) 11.9 - 14.6 %    PLATELET 97 (L) 743 - 450 K/uL    MPV 12.1 10.8 - 14.1 FL    DF AUTOMATED      NEUTROPHILS 81 (H) 43 - 78 %    LYMPHOCYTES 11 (L) 13 - 44 %    MONOCYTES 7 4.0 - 12.0 %    EOSINOPHILS 0 (L) 0.5 - 7.8 %    BASOPHILS 0 0.0 - 2.0 %    IMMATURE GRANULOCYTES 0.8 0.0 - 5.0 %    ABS. NEUTROPHILS 8.5 (H) 1.7 - 8.2 K/UL    ABS. LYMPHOCYTES 1.2 0.5 - 4.6 K/UL    ABS. MONOCYTES 0.7 0.1 - 1.3 K/UL    ABS. EOSINOPHILS 0.0 0.0 - 0.8 K/UL    ABS. BASOPHILS 0.0 0.0 - 0.2 K/UL    ABS. IMM. GRANS. 0.1 0.0 - 0.5 K/UL       Assessment and Plan:  Patient appears to be having uncomplicated post- course. Continue routine post-op care and maternal education.     Repeat cbc in the morning

## 2017-08-22 NOTE — PROGRESS NOTES
Spoke to Dr. Denita Maxwell regarding pt's IV, orders received to dc. Orders for Motrin q8 prn also received.

## 2017-08-22 NOTE — PROGRESS NOTES
George cath removed with cath tip intact. 650ml clear yellow urine emptied from bag upon removal.  IV in left hand converted to [facility]. Patient assisted up out of bed and to the bathroom. Linens and gown changed. Patient educated on klaudia-care and was able to demonstrate proper klaudia-care. Patient was unable to void at this time. Patient assisted back to bed without difficulty. Patient tolerated well.

## 2017-08-22 NOTE — PROGRESS NOTES
Used blue phones to communicate medications to patient. Discussed what each medicine is for, pt requested pain medication and agreed to MD ordered Colace and Mylicon via Interpretor. Reviewed plan of care including signs of clots and bleeding, help with klaudia care and pad changes, infant feeding and safe sleeping and use of nurses bell to call out for help or pain medicine, verbalized understanding. Pt's tray arrived, set up for lunch, water pitcher refilled, no further needs.

## 2017-08-23 LAB
ERYTHROCYTE [DISTWIDTH] IN BLOOD BY AUTOMATED COUNT: 16.3 % (ref 11.9–14.6)
HCT VFR BLD AUTO: 27.5 % (ref 35.8–46.3)
HGB BLD-MCNC: 9.2 G/DL (ref 11.7–15.4)
MCH RBC QN AUTO: 30.2 PG (ref 26.1–32.9)
MCHC RBC AUTO-ENTMCNC: 33.5 G/DL (ref 31.4–35)
MCV RBC AUTO: 90.2 FL (ref 79.6–97.8)
PLATELET # BLD AUTO: 128 K/UL (ref 150–450)
PMV BLD AUTO: 12.2 FL (ref 10.8–14.1)
RBC # BLD AUTO: 3.05 M/UL (ref 4.05–5.25)
WBC # BLD AUTO: 11.9 K/UL (ref 4.3–11.1)

## 2017-08-23 PROCEDURE — 36415 COLL VENOUS BLD VENIPUNCTURE: CPT | Performed by: OBSTETRICS & GYNECOLOGY

## 2017-08-23 PROCEDURE — 85027 COMPLETE CBC AUTOMATED: CPT | Performed by: OBSTETRICS & GYNECOLOGY

## 2017-08-23 PROCEDURE — 65270000029 HC RM PRIVATE

## 2017-08-23 PROCEDURE — 74011250637 HC RX REV CODE- 250/637: Performed by: OBSTETRICS & GYNECOLOGY

## 2017-08-23 RX ADMIN — HYDROCODONE BITARTRATE AND ACETAMINOPHEN 1 TABLET: 7.5; 325 TABLET ORAL at 00:30

## 2017-08-23 RX ADMIN — IBUPROFEN 800 MG: 800 TABLET ORAL at 10:48

## 2017-08-23 RX ADMIN — DOCUSATE SODIUM 100 MG: 100 CAPSULE, LIQUID FILLED ORAL at 18:15

## 2017-08-23 RX ADMIN — SIMETHICONE 80 MG: 80 TABLET, CHEWABLE ORAL at 10:48

## 2017-08-23 RX ADMIN — SIMETHICONE 80 MG: 80 TABLET, CHEWABLE ORAL at 18:15

## 2017-08-23 RX ADMIN — IBUPROFEN 800 MG: 800 TABLET ORAL at 00:29

## 2017-08-23 RX ADMIN — IBUPROFEN 800 MG: 800 TABLET ORAL at 18:15

## 2017-08-23 RX ADMIN — HYDROCODONE BITARTRATE AND ACETAMINOPHEN 1 TABLET: 7.5; 325 TABLET ORAL at 10:47

## 2017-08-23 RX ADMIN — SIMETHICONE 80 MG: 80 TABLET, CHEWABLE ORAL at 00:30

## 2017-08-23 RX ADMIN — DOCUSATE SODIUM 100 MG: 100 CAPSULE, LIQUID FILLED ORAL at 10:48

## 2017-08-23 NOTE — PROGRESS NOTES
Post-Operative Day Number 2 Progress Note    Patient doing well post-op day 2 from  delivery without significant complaints. Pain controlled on current medication. Voiding without difficulty, normal lochia. Vitals:  Patient Vitals for the past 8 hrs:   BP Temp Pulse Resp   17 1620 (P) 98/48 (P) 98.1 °F (36.7 °C) (P) 71 (P) 18     Temp (24hrs), Av.1 °F (36.7 °C), Min:98 °F (36.7 °C), Max:98.1 °F (36.7 °C)      Vital signs stable, afebrile. Exam:  Patient without distress. Abdomen soft, fundus firm below level of umbilicus, non tender. Incision dry and clean without erythema. Lower extremities are negative for swelling, cords or tenderness. Lab/Data Review:  Recent Results (from the past 24 hour(s))   CBC W/O DIFF    Collection Time: 17  8:18 AM   Result Value Ref Range    WBC 11.9 (H) 4.3 - 11.1 K/uL    RBC 3.05 (L) 4.05 - 5.25 M/uL    HGB 9.2 (L) 11.7 - 15.4 g/dL    HCT 27.5 (L) 35.8 - 46.3 %    MCV 90.2 79.6 - 97.8 FL    MCH 30.2 26.1 - 32.9 PG    MCHC 33.5 31.4 - 35.0 g/dL    RDW 16.3 (H) 11.9 - 14.6 %    PLATELET 021 (L) 625 - 450 K/uL    MPV 12.2 10.8 - 14.1 FL       Assessment and Plan:  Patient appears to be having uncomplicated post- course. Continue routine post-op care and maternal education.

## 2017-08-23 NOTE — PROGRESS NOTES
spoke with patient/FOB via [place] .  provided education/pamphlet on [facility] (community-based program that provides support/education thru baby's 3rd birthday). Also,  provided education/pamphlet on [facility]. Family states that they will review literature to decide if they would like to participate in either program.   will follow-up with family tomorrow.     Vera Katz, [address] [facility]

## 2017-08-23 NOTE — DISCHARGE INSTRUCTIONS
Parto por cesárea: Javed Brann en el hogar - [  Section: What to Expect at [facility] ]  Newman recuperación    Un parto por cesárea, o simplemente cesárea, es dominique cirugía mediante la cual se da a inez a un bebé a través de un cody, llamado incisión, que hace el médico en la parte baja del abdomen y [place]. Es posible que sienta dolor en la parte baja del abdomen y que necesite analgésicos (medicamentos para el dolor) lukas 1 o 2 semanas. Puede esperar tener algo de sangrado vaginal lukas varias semanas. Es probable que necesite unas 6 semanas para recuperarse completamente. Es importante que se tome las cosas con calma mientras augusto la incisión. Evite levantar objetos pesados, hacer actividades vigorosas o hacer ejercicios que pudieran esforzar los músculos abdominales mientras se recupera. Pídale a un familiar o amigo que la ayude con las tareas domésticas, la comida y las compras. Esta hoja de [employer] idea general del tiempo que le llevará recuperarse. Sin embargo, cada persona se recupera a un ritmo diferente. Siga los pasos que se mencionan a continuación para recuperarse lo más rápido posible. ¿Cómo puede cuidarse en el [facility]? Actividad  · Descanse cuando se sienta cansada. Dormir lo suficiente la ayudará a recuperarse. · Intente caminar todos los días. Comience caminando un poco más de lo que caminó el día anterior. Poco a poco, aumente la distancia. Caminar mejora el flujo de [place] a prevenir la neumonía, el estreñimiento y los coágulos de [place]. · Evite las actividades intensas, gab montar en bicicleta, trotar, levantar pesas y hacer ejercicios aeróbicos lukas 6 semanas o hasta que newman médico lo apruebe. · Hasta que newman médico lo apruebe, no levante nada más pesado que newman bebé. · No huang abdominales ni ningún otro ejercicio que [employer] músculos del abdomen lukas 6 semanas o hasta que newman médico lo apruebe.   · Sostenga dominique almohada sobre la incisión al toser o respirar profundamente. [place] apoyo a newman abdomen y reducirá el dolor. · Puede ducharse gab de costumbre. Seque la incisión con toques suaves de toalla cuando termine. · Tendrá algo de sangrado vaginal. Use toallas sanitarias. No se haung lavados vaginales ni use tampones hasta que el médico se lo permita. · Pregúntele a newman médico cuándo puede volver a conducir. · Es probable que necesite ausentarse del trabajo por lo menos 6 semanas. [place] dependerá del tipo de trabajo que huang y de cómo se sienta. · Pregúntele a newman médico cuándo puede tener [employer]. Alimentación  · Puede continuar con newman dieta normal. Si tiene [employer], coma alimentos suaves bajos en grasa, gab arroz sin condimentar, manuel a la bre, pan haim y yogur. · Deyanira abundantes líquidos (a menos que newman médico le indique lo contrario). · Podría notar que no evacua el intestino con regularidad dominique después de la [place]. [place] es común. Trate de evitar el estreñimiento y no hacer esfuerzos cuando evacua el intestino. [place] vez desee erika un suplemento de Met[place]acus. Si no ha evacuado el intestino después de un par de días, pregúntele a newman médico si puede erika un laxante suave. · Si está amamantando, no deyanira alcohol. Medicamentos  · Newman médico le dirá si puede volver a erika cara medicamentos y cuándo puede volver a hacerlo. También le dará indicaciones sobre cualquier medicamento nuevo que deba erika usted. · Si suzan medicamentos que previenen la formación de coágulos de [place], gab warfarina (Coumadin), clopidogrel (Plavix) o aspirina, asegúrese de hablar con newman médico. Él o rogerio le dirá si debe volver a erika estos medicamentos y en qué momento. Asegúrese de que entiende exactamente lo que el médico quiere que huang. · [employer] analgésicos (medicamentos para el dolor) exactamente gab le fueron indicados. ¨ Si el médico le recetó un analgésico, tómelo según las indicaciones.   ¨ Si no está tomando un analgésico recetado, pregúntele a newman médico si puede erika hortencia de [employer]. · Si le parece que el analgésico le está produciendo malestar estomacal:  ¨ [place] el medicamento después de las comidas (a menos que newman médico le haya indicado lo contrario). ¨ Pídale al médico un analgésico diferente. · Si newman médico le recetó antibióticos, tómelos según las indicaciones. No deje de tomarlos por el hecho de sentirse mejor. Debe erika todos los antibióticos hasta terminarlos. Cuidado de la incisión  · Si tiene tiras de cinta [employer] incisión, déjeselas puestas lukas dominique semana o hasta que se caigan por sí solas. · Lave la magda a diario con [place] tibia y séquela con toques suaves de toalla. No use peróxido de hidrógeno [place]) o alcohol porque pueden retrasar la sanación. Podría cubrir la magda con dominique venda de gasa si supura o roza contra la ropa. Cambie la venda todos los días. · Mantenga la magda limpia y seca. Otras instrucciones  · Si amamanta a newman bebé, marisol vez le resulte más cómodo, lukas el proceso de sanación, sostener al bebé de [place] en la que no se apoye en newman abdomen. Intente poner a newman bebé debajo del brazo, con el cuerpo del lado que lo [place] a amamantar. Sostenga la parte superior del cuerpo de newman bebé con newman Lenord Curb. Con elbert mano usted puede controlar la desean de newman bebé para acercarle la boca a newman seno. [place] se conoce a veces gab \"posición de balón de fútbol americano\". La atención de seguimiento es dominique parte clave de newman tratamiento y seguridad. Asegúrese de hacer y acudir a todas las citas, y llame a newman médico si está teniendo problemas. También es dominique buena idea saber los resultados de cara exámenes y mantener dominique lista de los medicamentos que suzan. ¿Cuándo debe pedir ayuda? Llame al 911 en cualquier momento que considere que necesita atención de [place]. Por ejemplo, llame si:  · Se desmayó (perdió el conocimiento).   · Tiene síntomas de un coágulo de jacinto en el pulmón (llamado embolia pulmonar). Estos pueden incluir:  ¨ Dolor repentino en el pecho. ¨ Dificultad para respirar. ¨ Toser jacinto. · Piensa en hacerse daño a sí misma o en lastimar a newman bebé o a otra persona. Llame a newman médico ahora mismo o busque atención médica inmediata si:  · Tiene sangrado vaginal intenso. [place] significa que empapa dominique toalla sanitaria cada hora lukas 2 horas o más. · Se siente mareada o aturdida, o gab si fuera a desmayarse. · Tiene dolor abdominal nuevo o que empeora. · Tiene puntos de sutura flojos o se le abre la incisión. · Tiene síntomas de infección, tales gab:  ¨ Aumento del dolor, la hinchazón, el enrojecimiento o la temperatura. ¨ Vetas rojizas que salen de la incisión. ¨ Pus que supura de la incisión. June Kahn. · Tiene síntomas de un coágulo de jacinto en la pierna (llamado trombosis venosa profunda), tales gab:  ¨ Dolor en la pantorrilla, el muslo, la ezio o detrás de la rodilla. ¨ Enrojecimiento e hinchazón en la pierna o la ezio. Preste especial atención a los cambios en newman logan y asegúrese de comunicarse con newman médico si:  · Se siente ashely, ansiosa o desesperanzada lukas más de unos días. · No mejora gab se esperaba. ¿Dónde puede encontrar más información en inglés? Blossom Garcia a [email or web address]-sravani.info/. Danielle Beckett [id number] en la búsqueda para aprender más acerca de \"Parto por cesárea: Gearldean Foots en el hogar - [  [facility] \"  Revisado: 2017  Versión del contenido: 11.3  © 2582-9281 [employer]. Las instrucciones de cuidado fueron adaptadas bajo licencia por Good [employer] (which disclaims liability or warranty for this information). Si usted tiene [place] afección médica o sobre estas instrucciones, siempre pregunte a newman profesional de logan. [employer] niega toda garantía o responsabilidad por newman uso de esta información. Después del parto (período de posparto): Instrucciones de cuidado - [ After Your Delivery (the Postpartum Period): Care Instructions ]  Instrucciones de cuidado  Felicidades por el nacimiento de newman bebé. Al igual que el Sweetie, el tiempo con el recién nacido puede ser un momento de [place], Georgie Oswald y agotamiento. Es posible que se sienta collado al mirar la jaya de newman pequeño bebé. También podría sentirse abrumada por newman nuevo ritmo de sueño y las nuevas responsabilidades. Al principio, los bebés suelen dormir lukas el día y permanecen despiertos lukas la noche. No tienen ningún patrón ni rutina. Podrían luis a gritos ahogados, sacudirse y despertarse, o parecer gab si tuvieran los ojos cruzados (bizcos). Todo esto es normal, e incluso la pueden hacer sonreír. Lukas las primeras semanas siguientes al parto, trate de cuidarse velasquez. Podría tardar de 4 a 6 semanas en volver a sentirse usted misma, y posiblemente más tiempo si le patterson hecho dominique cesárea. Es probable que se sienta muy fatigada lukas varias semanas. Thalia días estarán llenos de Fran, dino también de mucha alegría. La atención de seguimiento es dominique parte clave de newman tratamiento y seguridad. Asegúrese de hacer y acudir a todas las citas, y llame a newman médico si está teniendo problemas. También es dominique buena idea saber los resultados de los exámenes y mantener dominique lista de los medicamentos que suzan. ¿Cómo puede cuidarse en el hogar? Cuide newman cuerpo después del parto  · Utilice toallas sanitarias en vez de tampones para las pérdidas de jacinto que podrían durar hasta 2 semanas. · Alivie los cólicos con ibuprofeno (Advil, Motrin). · Alivie el dolor de las hemorroides y la magda entre la vagina y el recto con compresas de hielo o de infusión de hamamelis Agustin Locket (\"witch hazel\"). · Alivie el estreñimiento bebiendo abundante líquido y comiendo alimentos ricos en fibra.  Pregúntele a newman médico acerca de los ablandadores de heces de venta nico.  · Límpiese con un chorrito suave de agua tibia de dominique botella en vez de hacerlo con papel higiénico.  · [place] un baño de asiento en agua tibia varias veces al día. · Use un buen sostén de lactancia. Alivie el dolor y la hinchazón de los senos con toallitas de aseo húmedas tibias. · Si no está amamantando, utilice hielo en lugar de calor para aliviar el dolor de los senos. · Si está amamantando, newman período menstrual podría no comenzar hasta después de varios meses. Es posible que Antonio, y más tiempo al principio, de gab lo hacía antes del Shamika Yaya. · Espere hasta que haya sanado (de 4 a 6 semanas) antes de volver a tener relaciones sexuales. Newman médico le dirá cuándo puede tener relaciones sexuales. · Trate de no viajar con el bebé lukas las primeras 5 o 6 semanas. Si hace un viaje lynette en automóvil, huang paradas frecuentes para caminar y estirarse. Evite el agotamiento  · Descanse todos los días. Trate de dormir la siesta cuando newman bebé también lo hace. · Pídale a otro adulto que la acompañe por unos joselyn después del [place]. · Planifique el cuidado de los niños si tiene otros hijos. · Sea flexible para que pueda comer a horas fuera de lo común y dormir cuando lo necesite. Tanto usted gab newman bebé están creando horarios nuevos. · Planee pequeñas salidas para estar afuera de la casa. El cambio podría hacer que se sienta menos fatigada. · Pida ayuda para cocinar y [address] hogar y las compras. Recuerde que newman principal tarea consiste en cuidar a newman bebé. [place]ca la ayuda que puede recibir en aysha de tener depresión posparto  · La depresión posparto es común lukas las primeras 1 a 2 semanas siguientes al nacimiento. Podría llorar o sentirse ashely o irritable sin razón alguna. · Descanse cada vez que pueda hacerlo. Estar fatigada le dificulta manejar cara emociones. · Salga a caminar con newman bebé.   · Hable con newman danial, cara amigos y cara familiares acerca de cara sentimientos. · Si cara síntomas woo más de unas pocas semanas, o si se siente muy deprimida, pídale ayuda a newman médico.  · La depresión posparto puede tratarse. Los grupos de apoyo y la asesoría psicológica pueden ser de [place]. A veces, los medicamentos también pueden ayudar. Manténgase saludable  · Coma alimentos sanos para tener más energía, producir dominique buena [place] materna y adelgazar las libras adicionales que engordó con el bebé. · Si amamanta, evite el alcohol y las drogas. No fume. Si dejó de fumar lukas el embarazo, felicitaciones. · Inicie ejercicios diarios después de 4 a 6 semanas, dino descanse cuando se sienta fatigada. · Aprenda ejercicios para tonificar el abdomen. Ezra los ejercicios de [place] para recuperar la fuerza de los músculos pélvicos. Puede hacer los ejercicios de [place] mientras está de pie o sentada. ¨ Apriete los mismos músculos que usted usaría para detener la [place]. El abdomen y los muslos no deben moverse. ¨ Manténgalos apretados lukas 3 segundos y, luego, relájelos otros 3 segundos. ¨ Empiece con 3 segundos. Kathrine Fitting, añada 1 [place] cada semana hasta que sea capaz de apretar lukas 10 segundos. ¨ Repita el ejercicio entre 10 y 13 veces cada sesión. Ezra muna o más sesiones cada día. · Busque dominique clase para nuevas madres y recién nacidos que tenga un tiempo de ejercicio. · Si le patterson hecho dominique cesárea, dese un poco más de tiempo antes de hacer ejercicio, y tenga cuidado. ¿Cuándo debe pedir ayuda? Llame al 911 en cualquier momento que considere que necesita atención de [place]. Por ejemplo, llame si:  · Se desmayó (perdió el conocimiento). Llame a newman médico ahora mismo o busque atención médica inmediata si:  · Tiene sangrado vaginal intenso. [place] significa que está expulsando coágulos sanguíneos y empapando dominique toalla sanitaria cada hora por 2 horas o más. · Está mareada o aturdida, o siente gab que se puede [address] [facility] 15. · Tiene fiebre.   · Tiene dolor abdominal nuevo o que empeora. Preste especial atención a los cambios en newman logan y asegúrese de comunicarse con newman médico si:  · Newman sangrado vaginal parece volverse más intenso. · Tiene flujo vaginal nuevo o que empeora. · Se siente ashely, ansiosa o sin esperanzas lukas más de unos pocos días. · No mejora gab se esperaba. ¿Dónde puede encontrar más información en inglés? Sophia End a [email or web address]-sravani.[email or web address] Aylin Elisa C251 en la búsqueda para aprender más acerca de \"Después del parto (período de posparto): Instrucciones de cuidado - [ After Your Delivery (the Postpartum Period): Care Instructions ]. \"  Revisado: 16 marzo, 2017  Versión del contenido: 11.3  © 9973-2180 [employer]wise, Inc[employer]. Las instrucciones de cuidado fueron adaptadas bajo licencia por Good Help Connections (which disclaims liability or warranty for this information). Si usted tiene [place] afección médica o sobre estas instrucciones, siempre pregunte a newman profesional de logan. Healthwise, Incorporated niega toda garantía o responsabilidad por newman uso de esta información. DISCHARGE SUMMARY from Nurse    The following personal items are in your possession at time of discharge:    Dental Appliances: None  Visual Aid: None     Home Medications: None  Jewelry: None  Clothing: At bedside  Other Valuables: Cell Phone, Jackie Don             PATIENT INSTRUCTIONS:    After general anesthesia or intravenous sedation, for 24 hours or while taking prescription Narcotics:  · Limit your activities  · Do not drive and operate hazardous machinery  · Do not make important personal or business decisions  · Do  not drink alcoholic beverages  · If you have not urinated within 8 hours after discharge, please contact your surgeon on call.     Report the following to your surgeon:  · Excessive pain, swelling, redness or odor of or around the surgical area  · Temperature over 100.5  · Nausea and vomiting lasting longer than 4 hours or if unable to take medications  · Any signs of decreased circulation or nerve impairment to extremity: change in color, persistent  numbness, tingling, coldness or increase pain  · Any questions        What to do at Home:  Recommended activity:    Discharge instruction to follow: Activity: Pelvis rest for 6 weeks     No heavy lifting over 15 lbs for 2 weeks     No driving for 2 weeks     No push/pull motion such as sweeping or vacuuming for 2 weeks     No tub baths for 6 weeks     section keep incision clean and dry, may shower as normal with soap and water. Inspect incision every day for signs of infection listed below. Continue to use klaudia-bottle with every void or bowel movement until comfortable stopping. Change sanitary pad after each urination or bowel movement. Call MD for the following:      Fever over 101 F; pain not relieved by medication; foul smelling vaginal discharge or increase in vaginal bleeding. Redness, swelling, or drainage from  incision. Take medication as prescribed. Follow up with MD as order. *  Please give a list of your current medications to your Primary Care Provider. *  Please update this list whenever your medications are discontinued, doses are      changed, or new medications (including over-the-counter products) are added. *  Please carry medication information at all times in case of emergency situations. These are general instructions for a healthy lifestyle:    No smoking/ No tobacco products/ Avoid exposure to second hand smoke    Surgeon General's Warning:  Quitting smoking now greatly reduces serious risk to your health.     Obesity, smoking, and sedentary lifestyle greatly increases your risk for illness    A healthy diet, regular physical exercise & weight monitoring are important for maintaining a healthy lifestyle    You may be retaining fluid if you have a history of heart failure or if you experience any of the following symptoms:  Weight gain of 3 pounds or more overnight or 5 pounds in a week, increased swelling in our hands or feet or shortness of breath while lying flat in bed. Please call your doctor as soon as you notice any of these symptoms; do not wait until your next office visit. Recognize signs and symptoms of STROKE:    F-face looks uneven    A-arms unable to move or move unevenly    S-speech slurred or non-existent    T-time-call 911 as soon as signs and symptoms begin-DO NOT go       Back to bed or wait to see if you get better-TIME IS BRAIN. Warning Signs of HEART ATTACK     Call 911 if you have these symptoms:   Chest discomfort. Most heart attacks involve discomfort in the center of the chest that lasts more than a few minutes, or that goes away and comes back. It can feel like uncomfortable pressure, squeezing, fullness, or pain.  Discomfort in other areas of the upper body. Symptoms can include pain or discomfort in one or both arms, the back, neck, jaw, or stomach.  Shortness of breath with or without chest discomfort.  Other signs may include breaking out in a cold sweat, nausea, or lightheadedness. Don't wait more than five minutes to call 911 - MINUTES MATTER! Fast action can save your life. Calling 911 is almost always the fastest way to get lifesaving treatment. Emergency Medical Services staff can begin treatment when they arrive -- up to an hour sooner than if someone gets to the hospital by car. The discharge information has been reviewed with the patient. The patient verbalized understanding. Discharge medications reviewed with the patient and appropriate educational materials and side effects teaching were provided.

## 2017-08-24 VITALS
WEIGHT: 150 LBS | DIASTOLIC BLOOD PRESSURE: 52 MMHG | RESPIRATION RATE: 18 BRPM | SYSTOLIC BLOOD PRESSURE: 113 MMHG | HEART RATE: 73 BPM | BODY MASS INDEX: 27.6 KG/M2 | HEIGHT: 62 IN | OXYGEN SATURATION: 99 % | TEMPERATURE: 98.1 F

## 2017-08-24 PROCEDURE — 90715 TDAP VACCINE 7 YRS/> IM: CPT | Performed by: OBSTETRICS & GYNECOLOGY

## 2017-08-24 PROCEDURE — 77030008031

## 2017-08-24 PROCEDURE — 74011250637 HC RX REV CODE- 250/637: Performed by: OBSTETRICS & GYNECOLOGY

## 2017-08-24 PROCEDURE — 74011250636 HC RX REV CODE- 250/636: Performed by: OBSTETRICS & GYNECOLOGY

## 2017-08-24 RX ORDER — HYDROCODONE BITARTRATE AND ACETAMINOPHEN 7.5; 325 MG/1; MG/1
1 TABLET ORAL
Qty: 30 TAB | Refills: 0 | Status: SHIPPED | OUTPATIENT
Start: 2017-08-24

## 2017-08-24 RX ORDER — IBUPROFEN 800 MG/1
800 TABLET ORAL
Qty: 30 TAB | Refills: 0 | Status: SHIPPED | OUTPATIENT
Start: 2017-08-24

## 2017-08-24 RX ADMIN — SIMETHICONE 80 MG: 80 TABLET, CHEWABLE ORAL at 10:40

## 2017-08-24 RX ADMIN — TETANUS TOXOID, REDUCED DIPHTHERIA TOXOID AND ACELLULAR PERTUSSIS VACCINE, ADSORBED 0.5 ML: 5; 2.5; 8; 8; 2.5 SUSPENSION INTRAMUSCULAR at 10:39

## 2017-08-24 RX ADMIN — IBUPROFEN 800 MG: 800 TABLET ORAL at 10:40

## 2017-08-24 RX ADMIN — DOCUSATE SODIUM 100 MG: 100 CAPSULE, LIQUID FILLED ORAL at 10:40

## 2017-08-24 RX ADMIN — IBUPROFEN 800 MG: 800 TABLET ORAL at 02:13

## 2017-08-24 NOTE — PROGRESS NOTES
present during Nurse Duke Holloway [place] bracelet was compared with Mom and Dad's bracelets assessment

## 2017-08-24 NOTE — DISCHARGE SUMMARY
Obstetrical Discharge Summary     Name: Padma Carey MRN: [id number]  SSN: [id number]    YOB: 1985  Age: 29 y.o. Sex: female      Admit Date: 2017    Discharge Date: 2017     Admitting Physician: Aster Armando MD     Attending Physician:  Aster Armando MD     * Admission Diagnoses: labor;Normal labor;JOSEMANUEL 17 Primary C/S    * Discharge Diagnoses:   Information for the patient's :  Artis Knox [[id number]]   Delivery of a 3.295 kg female infant via , Low Transverse on 2017 at 10:03 AM  by . Apgars were 9 and 9. Additional Diagnoses:   Hospital Problems as of 2017  Date Reviewed: 2017          Codes Class Noted - Resolved POA    Normal labor ICD-10-CM: O80, Z37.9  ICD-9-CM: 029  2017 - Present Unknown             Lab Results   Component Value Date/Time    ABO/Rh(D) O POSITIVE 2017 12:42 AM    Rubella, External Immune 03/15/2017      Immunization History   Administered Date(s) Administered    Tdap 2017       * Procedures:   Procedure(s):   SECTION      [place]  Depression Scale  I have been able to laugh and see the funny side of things: Not quite so much now  I have looked forward with enjoyment to things: As much as I ever did  I have blamed myself unnecessarily when things went wrong: No, never  I have been anxious or worried for no good reason: Hardly ever  I have felt scared or panicky for no very good reason: No, not at all  Things have been getting on top of me: No, most of the time I have coped quite well  I have been so unhappy that I have had difficulty sleeping: No, not at all  I have felt sad or miserable: No, not at all  I have been so unhappy that I have been crying: No, never  The thought of harming myself has occurred to me: Never  Total Score: 3    * Discharge Condition: stable    * Hospital Course: Normal hospital course following the delivery.     * Disposition: Home    Discharge Medications:   Current Discharge Medication List      START taking these medications    Details   HYDROcodone-acetaminophen (NORCO) 7.5-325 mg per tablet Take 1 Tab by mouth every four (4) hours as needed. Max Daily Amount: 6 Tabs. Qty: 30 Tab, Refills: 0      ibuprofen (MOTRIN) 800 mg tablet Take 1 Tab by mouth every eight (8) hours as needed. Qty: 30 Tab, Refills: 0         CONTINUE these medications which have NOT CHANGED    Details   ferrous sulfate 325 mg (65 mg iron) tablet Take 325 mg by mouth daily. PRENATAL VIT CALC,IRON,FOLIC (PRENATAL #2 PO) Take  by mouth. docusate sodium (COLACE) 100 mg capsule Take 100 mg by mouth daily. * Follow-up Care/Patient Instructions:   Activity: No sex for 6 weeks  Diet: Regular Diet  Wound Care: As directed    Follow-up Information     Follow up With Details Comments [facility] MD Mela In 6 weeks call office to schedule an appointment to be seen in 6 weeks [address] [place] [address] Rd  [phone]      Provider Unknown   Patient not available to ask             Signed By:  Yordy Correia MD     August 24, 2017

## 2017-08-24 NOTE — PROGRESS NOTES
Post-Operative Day Number 3 Progress/Discharge Note    Patient doing well post-op day 3 from  delivery without significant complaints. Pain controlled on current medication. Voiding without difficulty, normal lochia. Vitals:  Patient Vitals for the past 8 hrs:   BP Temp Pulse Resp   17 0723 113/52 98.1 °F (36.7 °C) 73 18     Temp (24hrs), Av.1 °F (36.7 °C), Min:98.1 °F (36.7 °C), Max:98.1 °F (36.7 °C)      Vital signs stable, afebrile. Exam:  Patient without distress. Abdomen soft, fundus firm below level of umbilicus, non tender. Incision dry and  clean without erythema. Lower extremities are negative for swelling, cords or tenderness. Lab/Data Review:      Assessment and Plan:  Patient appears to be having uncomplicated post- course. Continue routine post-op care and maternal education. Plan discharge for today with follow up in our office in 6 weeks.

## 2017-08-24 NOTE — PROGRESS NOTES
present during Nurse Ally Kendrick Discharge Instructions Mother and [facility] and  Pediatrician appointment

## 2017-08-24 NOTE — PROGRESS NOTES
Social work follow-up with family; communicated via [facility] ([address] ). Family declined referral for [facility] or [facility].     Daiana Mart, [address] [facility]

## 2017-08-24 NOTE — PROGRESS NOTES
ID bands verified,Discharge teaching completed with Jessenia Ren the ,prescriptions given,  pt verbalizes understanding; questions encouraged.   Patient and  to call out when ready to be escorted out
